# Patient Record
Sex: MALE | Race: WHITE | NOT HISPANIC OR LATINO | Employment: OTHER | URBAN - METROPOLITAN AREA
[De-identification: names, ages, dates, MRNs, and addresses within clinical notes are randomized per-mention and may not be internally consistent; named-entity substitution may affect disease eponyms.]

---

## 2023-11-27 ENCOUNTER — TELEPHONE (OUTPATIENT)
Dept: PULMONOLOGY | Facility: CLINIC | Age: 78
End: 2023-11-27
Payer: MEDICARE

## 2023-11-27 DIAGNOSIS — J44.1 CHRONIC OBSTRUCTIVE PULMONARY DISEASE WITH ACUTE EXACERBATION (MULTI): Primary | ICD-10-CM

## 2023-11-27 RX ORDER — AMOXICILLIN AND CLAVULANATE POTASSIUM 875; 125 MG/1; MG/1
875 TABLET, FILM COATED ORAL 2 TIMES DAILY
Qty: 14 TABLET | Refills: 0 | Status: SHIPPED | OUTPATIENT
Start: 2023-11-27 | End: 2023-12-04

## 2023-11-27 NOTE — TELEPHONE ENCOUNTER
Patient is having COPD flair up due to weather change. He is cough a lot and has lots of phlegm. His phlegm currently is discolored light brownish. Aware you are out today. Can you call in antibiotic to [facility] in [place]?

## 2023-12-04 ENCOUNTER — TELEPHONE (OUTPATIENT)
Dept: PULMONOLOGY | Facility: CLINIC | Age: 78
End: 2023-12-04
Payer: MEDICARE

## 2023-12-04 DIAGNOSIS — J44.1 CHRONIC OBSTRUCTIVE PULMONARY DISEASE WITH ACUTE EXACERBATION (MULTI): Primary | ICD-10-CM

## 2023-12-06 RX ORDER — PREDNISONE 10 MG/1
TABLET ORAL
Qty: 30 TABLET | Refills: 0 | Status: SHIPPED | OUTPATIENT
Start: 2023-12-06 | End: 2024-01-05

## 2023-12-06 RX ORDER — AZITHROMYCIN 250 MG/1
TABLET, FILM COATED ORAL
Qty: 6 TABLET | Refills: 0 | Status: SHIPPED | OUTPATIENT
Start: 2023-12-06 | End: 2023-12-11

## 2023-12-07 NOTE — TELEPHONE ENCOUNTER
Will order a Zpack and prednisone taper. Let him know I ordered PFTS to get before his follow up visit with me in February but wait until he is feeling better and off prednisone for 2 weeks.

## 2023-12-13 ENCOUNTER — APPOINTMENT (OUTPATIENT)
Dept: PULMONOLOGY | Facility: CLINIC | Age: 78
End: 2023-12-13
Payer: MEDICARE

## 2023-12-13 NOTE — PROGRESS NOTES
Patient: Sindhu Salazar    [id number]  : 1945 -- AGE 78 y.o.    Provider: Ami GUZMAN- [facility]     Location [facility]   Service Date:   23       Department of Medicine  Division of Pulmonary, Critical Care, and Sleep Medicine     [facility]  Follow Up Visit Note      HISTORY OF PRESENT ILLNESS     HISTORY OF PRESENT ILLNESS   Sindhu Salazar is a 78 y.o. male who is a former smoker (~75 pack years), who presents to a [facility] for a follow up evaluation for COPD.    DATE OF LAST VISIT: 23    Current History    Since last visit Mr. Salazar had a flare, needed antibiotic and prednisone.  He reports he feels much better and back to baseline.  Still having yellow to light brown mucus, occasional wheezing and having difficulty bringing up phlegm at times.  Has not been using his nebulizer.  Uses Dulera 2 puffs twice a day and rarely using Combivent.  He denies fevers, chills, chest pain and ER visits for breathing issues.  He is up-to-date on vaccines    23: Mr. Salazar was seeing pulmonology at [facility] and is switching his care over to . He is currently using Symbicort 2 puffs BID. He has 3 Dulera inhalers left at home that he is going to switch back to when finished with symbicort. He uses duo nebs twice daily and combivent when he is out, does not use often. Bronchitis 2 months ago requiring medrol dose pack and antibiotic feeling back to baseline. He reports having a cold about a week ago and is feeling much better. He coughs occasionally with yellowish mucus. Occasional wheeze. ETIENNE with stairs. Denies SOB at rest, chest pain and ER visits for breathing issues.      Previous pulmonary history: He was previously told he has COPD. He currently is on no supplemental oxygen. He has never been to pulmonary rehab. Bronchitis 2 months ago requiring medrol dose pack.      Inhalers/nebulized medications: Dulera, combivent  and florian pollard     Hospitalization History: He has not been hospitalized over the last year for breathing related problem.     Sleep history: Denies snoring, apnea, feeling tired during the day or taking naps during the day.        REVIEW OF SYSTEMS     REVIEW OF SYSTEMS  Review of Systems    Constitutional: No fever, no chills, no night sweats.    Eyes: No double vision, no floaters, no dry eyes.   ENT: See HPI.   Neck: No neck stiffness.  Cardiovascular: No sharp chest pain, no heart racing, no leg swelling.  Respiratory: as noted in HPI.   Integumentary: No rashes or sores.  Neurological: No dizziness, no headaches. Sleeping well.  Psychiatric: No mood changes.       ALLERGIES AND MEDICATIONS     ALLERGIES  Not on File    MEDICATIONS  Current Outpatient Medications   Medication Sig Dispense Refill    predniSONE (Deltasone) 10 mg tablet Take 4 tabs (40mg) daily for 3 days, then 3 tabs (30mg) daily for 3 days,  2 tabs (20mg) daily for 3 days,  1 tab (10 mg) daily for 3 days. 30 tablet 0     No current facility-administered medications for this visit.         PAST HISTORY     PAST MEDICAL HISTORY  Prostate Cancer  Basal cell carcinoma of the face  COPD     PAST SURGICAL HISTORY  No past surgical history on file.    IMMUNIZATION HISTORY    There is no immunization history on file for this patient.    SOCIAL HISTORY  Tobacco Smokin -47 y/o - 3 packs - quit in  - ~75 pack years   Illicit drugs: none  Alcohol consumption:  socially  Pets: none    OCCUPATIONAL/ENVIRONMENTAL HISTORY  Occupation: Previously worked as  for [employer]. House he used to live in had asbestos .     FAMILY HISTORY    No have a family history of pulmonary disease.  No have a family history of cancer.      PHYSICAL EXAM     VITAL SIGNS:   Vitals:    23 0808   BP: 125/74   Pulse: 83   Temp: 35.9 °C (96.7 °F)   SpO2: 91%         PREVIOUS WEIGHTS: Body mass index is 24.48 kg/m².    Wt Readings from Last 3 Encounters:  "  05/19/23 67.9 kg (149 lb 12.8 oz)   10/01/21 72.6 kg (160 lb)       Physical Exam    Constitutional: General appearance: Alert and oriented.  No acute distress. Well developed, well nourished.  Head and face: Symmetric  Pulmonary: Chest is normal. No increased work of breathing or signs of respiratory distress. Clear to auscultation bilaterally - no crackles, wheezing, or rhonchi.   Cardiovascular: Heart rate and rhythm normal. Normal S1, S2 - no murmurs, gallops, or pericardial rub.   Abdomen: Soft, non tender, +BS  Extremities: No edema. No clubbing or cyanosis of the fingernails.    Neurologic: Moves all four extremities   MSK: Normal movements of extremities. Gait normal   Psychiatric: Intact judgement and insight.    RESULTS/DATA     Pulmonary Function Test Results     2/2018: FEV1/FVC 0.47 /FEV1 2.06 (74%)/FVC 4.40 (115%)/ TLC  (128%)/ %/ DLCO 51%      Chest Radiograph     No results found for this or any previous visit from the past 2000 days.      Chest CT Scan     No results found for this or any previous visit from the past 365 days.      Echocardiogram     No results found for this or any previous visit from the past 365 days.       Labwork   Complete Blood Count  No results found for: \"WBC\", \"HGB\", \"HCT\", \"MCV\", \"PLT\"    Peripheral Eosinophil Count/Percentage:   No results found for: \"EOSABS\", \"EOSPCT\"    Serum Immunoglobulin E:    No results found for: \"IGE\"       ASSESSMENT/PLAN     Mr. Salazar is a 78 y.o. male, who is a former smoker (~75 pack years), who presents to a [facility] for a follow up evaluation for COPD.    Problem List and Orders  Problem List Items Addressed This Visit    None  Visit Diagnoses       Chronic obstructive pulmonary disease, unspecified COPD type (CMS/HCC)    -  Primary    Relevant Orders    Complete Pulmonary Function Test Pre/Post Bronchodialator (Spirometry Pre/Post/DLCO/Lung Volumes)            Assessment and Plan / " Recommendations:  Problem List Items Addressed This Visit    None     COPD-GOLD2  - will get new PFTs b/f next visit  - Continue Dulera 2 puffs twice a day   - continue Combivent inhaler or ipratropium- albuterol nebulizer every 4-6 hours as needed      - Can try OTC Mucinex BID for mucus relief     Follow up in 6 months (after PFTs) or sooner if needed.    If you have any questions please call the office [phone]    Thank you for visiting the Pulmonary clinic today!   Ami Delacruz CNP  [phone]

## 2023-12-14 ENCOUNTER — OFFICE VISIT (OUTPATIENT)
Dept: PULMONOLOGY | Facility: CLINIC | Age: 78
End: 2023-12-14
Payer: MEDICARE

## 2023-12-14 VITALS
HEART RATE: 83 BPM | BODY MASS INDEX: 24.17 KG/M2 | TEMPERATURE: 96.7 F | DIASTOLIC BLOOD PRESSURE: 74 MMHG | OXYGEN SATURATION: 91 % | WEIGHT: 154 LBS | HEIGHT: 67 IN | SYSTOLIC BLOOD PRESSURE: 125 MMHG

## 2023-12-14 DIAGNOSIS — J44.9 CHRONIC OBSTRUCTIVE PULMONARY DISEASE, UNSPECIFIED COPD TYPE (MULTI): Primary | ICD-10-CM

## 2023-12-14 PROCEDURE — 1126F AMNT PAIN NOTED NONE PRSNT: CPT

## 2023-12-14 PROCEDURE — 1159F MED LIST DOCD IN RCRD: CPT

## 2023-12-14 PROCEDURE — 99214 OFFICE O/P EST MOD 30 MIN: CPT

## 2023-12-14 ASSESSMENT — PATIENT HEALTH QUESTIONNAIRE - PHQ9
SUM OF ALL RESPONSES TO PHQ9 QUESTIONS 1 AND 2: 0
2. FEELING DOWN, DEPRESSED OR HOPELESS: NOT AT ALL
1. LITTLE INTEREST OR PLEASURE IN DOING THINGS: NOT AT ALL

## 2023-12-14 ASSESSMENT — PAIN SCALES - GENERAL: PAINLEVEL: 0-NO PAIN

## 2024-01-02 ENCOUNTER — TELEPHONE (OUTPATIENT)
Dept: PULMONOLOGY | Facility: HOSPITAL | Age: 79
End: 2024-01-02
Payer: MEDICARE

## 2024-01-02 NOTE — TELEPHONE ENCOUNTER
Spoke with pt regarding his trazodone refill request. Pt stated that he has not taken trazodone the last few months and decided to try and get it refilled. It was explained that Ami Delacruz CNP was not the original ordering provider for this medication and he needs to contact his PCP. He stated that he doesn't have a PCP anymore. In the system, María Chapman CNP is listed as pt's PCP. He stated that she retired a couple years ago. He was advised to call that office to see if he can get himself established with a different provider. He stated that he was not going to do that. He verbalized understanding that Ami will not refill his trazodone. He inquired about upcoming appointments. He confirmed his FUV on 6/14/2024 with Ami Delacruz CNP. He was transferred to Allyn Wang to schedule his PFT's. He was also given the phone number to schedule these tests.

## 2024-02-19 ENCOUNTER — TELEPHONE (OUTPATIENT)
Dept: PULMONOLOGY | Facility: CLINIC | Age: 79
End: 2024-02-19
Payer: MEDICARE

## 2024-02-19 DIAGNOSIS — J44.9 CHRONIC OBSTRUCTIVE PULMONARY DISEASE, UNSPECIFIED COPD TYPE (MULTI): Primary | ICD-10-CM

## 2024-02-19 RX ORDER — AZITHROMYCIN 250 MG/1
TABLET, FILM COATED ORAL
Qty: 6 TABLET | Refills: 0 | Status: SHIPPED | OUTPATIENT
Start: 2024-02-19 | End: 2024-02-24

## 2024-02-19 RX ORDER — PREDNISONE 20 MG/1
40 TABLET ORAL DAILY
Qty: 10 TABLET | Refills: 0 | Status: SHIPPED | OUTPATIENT
Start: 2024-02-19 | End: 2024-02-24

## 2024-02-19 NOTE — TELEPHONE ENCOUNTER
Sindhu called in sting that he is coughing, having phlegm. He would would like the antibiotic that you called in for him last time, He uses [facility] in [place]. Please Advise

## 2024-04-12 ENCOUNTER — TELEPHONE (OUTPATIENT)
Dept: PULMONOLOGY | Facility: CLINIC | Age: 79
End: 2024-04-12
Payer: MEDICARE

## 2024-04-12 DIAGNOSIS — J44.1 COPD EXACERBATION (MULTI): Primary | ICD-10-CM

## 2024-04-12 RX ORDER — AMOXICILLIN AND CLAVULANATE POTASSIUM 875; 125 MG/1; MG/1
1 TABLET, FILM COATED ORAL 2 TIMES DAILY
Qty: 20 TABLET | Refills: 0 | Status: SHIPPED | OUTPATIENT
Start: 2024-04-12 | End: 2024-04-22

## 2024-04-12 RX ORDER — PREDNISONE 20 MG/1
40 TABLET ORAL DAILY
Qty: 10 TABLET | Refills: 0 | Status: SHIPPED | OUTPATIENT
Start: 2024-04-12 | End: 2024-04-17

## 2024-04-12 NOTE — TELEPHONE ENCOUNTER
Patient is request refill for prednisone and antibiotic. He states he is currently having a flair up. Cough, phlegm is yellow right now. Can you call in prescription?

## 2024-04-24 ENCOUNTER — TELEPHONE (OUTPATIENT)
Dept: PULMONOLOGY | Facility: CLINIC | Age: 79
End: 2024-04-24
Payer: MEDICARE

## 2024-04-24 NOTE — TELEPHONE ENCOUNTER
Patient called in saying that his bronchial tubes are vibrating. He would like to speak with you about this. Please advise     [phone]

## 2024-05-06 ENCOUNTER — APPOINTMENT (OUTPATIENT)
Dept: RESPIRATORY THERAPY | Facility: HOSPITAL | Age: 79
End: 2024-05-06
Payer: MEDICARE

## 2024-06-06 ENCOUNTER — APPOINTMENT (OUTPATIENT)
Dept: RESPIRATORY THERAPY | Facility: HOSPITAL | Age: 79
End: 2024-06-06
Payer: MEDICARE

## 2024-06-14 ENCOUNTER — APPOINTMENT (OUTPATIENT)
Dept: PULMONOLOGY | Facility: CLINIC | Age: 79
End: 2024-06-14
Payer: MEDICARE

## 2024-06-17 NOTE — PROGRESS NOTES
Patient: Sindhu Salazar    [id number]  : 1945 -- AGE 79 y.o.    Provider: Ami GUZMAN- [facility]     Location [facility]   Service Date:  24       Department of Medicine  Division of Pulmonary, Critical Care, and Sleep Medicine     [facility]  Follow Up Visit Note      HISTORY OF PRESENT ILLNESS     HISTORY OF PRESENT ILLNESS   Sindhu Salazar is a 79 y.o. male with a history of Prostate Cancer, Basal cell carcinoma of the face, and COPD, who is a former smoker (~75 pack years), who presents to a [facility] for a follow up evaluation for COPD.    DATE OF LAST VISIT: 23    Current History    Since last visit he reports using Dulera 2 puffs twice daily.  Rarely uses Combivent.  Uses Mucinex as needed states it was helping but he is afraid to overdo it.  Uses nebulizer once a day.  States he is coughing up yellow phlegm, wheezing and having dyspnea on exertion.  He feels his symptoms have worsened since December.  He has PFTs scheduled for 2024 he denies shortness of breath at rest, chest tightness, GERD, nighttime symptoms and ER visits for breathing issues.    23: Since last visit Mr. Salazar had a flare, needed antibiotic and prednisone.  He reports he feels much better and back to baseline.  Still having yellow to light brown mucus, occasional wheezing and having difficulty bringing up phlegm at times.  Has not been using his nebulizer.  Uses Dulera 2 puffs twice a day and rarely using Combivent.  He denies fevers, chills, chest pain and ER visits for breathing issues.  He is up-to-date on vaccines    23: Mr. Salazar was seeing pulmonology at [facility] and is switching his care over to . He is currently using Symbicort 2 puffs BID. He has 3 Dulera inhalers left at home that he is going to switch back to when finished with symbicort. He uses duo nebs twice daily and combivent when he is out, does not use  often. Bronchitis 2 months ago requiring medrol dose pack and antibiotic feeling back to baseline. He reports having a cold about a week ago and is feeling much better. He coughs occasionally with yellowish mucus. Occasional wheeze. ETIENNE with stairs. Denies SOB at rest, chest pain and ER visits for breathing issues.      Previous pulmonary history: He was previously told he has COPD. He currently is on no supplemental oxygen. He has never been to pulmonary rehab. Bronchitis 2 months ago requiring medrol dose pack.      Inhalers/nebulized medications: Dulera, combivent and duo nebs     Hospitalization History: He has not been hospitalized over the last year for breathing related problem.     Sleep history: Denies snoring, apnea, feeling tired during the day or taking naps during the day.        REVIEW OF SYSTEMS     REVIEW OF SYSTEMS  Review of Systems    Constitutional: No fever, no chills, no night sweats.    Eyes: No double vision, no floaters, no dry eyes.   ENT: See HPI.   Neck: No neck stiffness.  Cardiovascular: No sharp chest pain, no heart racing, no leg swelling.  Respiratory: as noted in HPI.   Integumentary: No rashes or sores.  Neurological: No dizziness, no headaches. Sleeping well.  Psychiatric: No mood changes.       ALLERGIES AND MEDICATIONS     ALLERGIES  Allergies   Allergen Reactions    Iodine Hives     IVP DYE    Sulfa (Sulfonamide Antibiotics) Rash     Remote       MEDICATIONS  Current Outpatient Medications   Medication Sig Dispense Refill    ipratropium-albuteroL (Combivent Respimat)  mcg/actuation inhaler Inhale 1 puff 4 times a day.      mometasone-formoterol (Dulera 200) 200-5 mcg/actuation inhaler Inhale 400 mcg twice a day.       No current facility-administered medications for this visit.         PAST HISTORY     PAST MEDICAL HISTORY  Prostate Cancer  Basal cell carcinoma of the face  COPD     PAST SURGICAL HISTORY  No past surgical history on file.    IMMUNIZATION  HISTORY  Immunization History   Administered Date(s) Administered    Pfizer Gray Cap SARS-CoV-2 2022    Pfizer Purple Cap SARS-CoV-2 2021, 2021, 10/15/2021       SOCIAL HISTORY  Tobacco Smokin -45 y/o - 3 packs - quit in  - ~75 pack years   Illicit drugs: none  Alcohol consumption:  socially  Pets: none    OCCUPATIONAL/ENVIRONMENTAL HISTORY  Occupation: Previously worked as  for [employer]. House he used to live in had asbestos .     FAMILY HISTORY    No have a family history of pulmonary disease.  No have a family history of cancer.      PHYSICAL EXAM     VITAL SIGNS:   There were no vitals filed for this visit.        PREVIOUS WEIGHTS: There is no height or weight on file to calculate BMI.    Wt Readings from Last 3 Encounters:   23 69.9 kg (154 lb)   23 67.9 kg (149 lb 12.8 oz)   10/01/21 72.6 kg (160 lb)       Physical Exam    Constitutional: General appearance: Alert and oriented.  No acute distress. Well developed, well nourished.  Head and face: Symmetric  Pulmonary: Chest is normal. No increased work of breathing or signs of respiratory distress. Clear to auscultation bilaterally - no crackles, wheezing, or rhonchi.   Cardiovascular: Heart rate and rhythm normal. Normal S1, S2 - no murmurs, gallops, or pericardial rub.   Abdomen: Soft, non tender, +BS  Extremities: No edema. No clubbing or cyanosis of the fingernails.    Neurologic: Moves all four extremities   MSK: Normal movements of extremities. Gait normal   Psychiatric: Intact judgement and insight.    RESULTS/DATA     Pulmonary Function Test Results     2018: FEV1/FVC 0.47 /FEV1 2.06 (74%)/FVC 4.40 (115%)/ TLC  (128%)/ %/ DLCO 51%      Chest Radiograph     No results found for this or any previous visit from the past 2000 days.      Chest CT Scan     No results found for this or any previous visit from the past 365 days.      Echocardiogram     No results found for this or any previous visit  "from the past 365 days.       Labwork   Complete Blood Count  No results found for: \"WBC\", \"HGB\", \"HCT\", \"MCV\", \"PLT\"    Peripheral Eosinophil Count/Percentage:   No results found for: \"EOSABS\", \"EOSPCT\"    Serum Immunoglobulin E:    No results found for: \"IGE\"       ASSESSMENT/PLAN     Mr. Salazar is a 79 y.o. male, with a history of Prostate Cancer, Basal cell carcinoma of the face, and COPD, who is a former smoker (~75 pack years), who presents to a [facility] for a follow up evaluation for COPD.        Problem List and Orders  Problem List Items Addressed This Visit    None        Assessment and Plan / Recommendations:  Problem List Items Addressed This Visit    None     COPD-GOLD2 (PFTs from 2018)  - will get new PFTs b/f next visit- scheduled for 7/29/24  - Continue Dulera 2 puffs twice a day   -Start Spiriva 1 capsule daily  - continue Combivent inhaler or ipratropium- albuterol nebulizer every 4-6 hours as needed  - Can try OTC Mucinex BID for mucus relief   - Start Augmentin for 10 days and prednisone burst for COPD flare up    Follow up in 2 months (after PFTs) or sooner if needed.  Has an appointment with Dr. Delgado in August that he would like to keep    If you have any questions please call the office [phone]    Thank you for visiting the Pulmonary clinic today!   Ami Delacruz CNP  [phone]      "

## 2024-06-19 ENCOUNTER — APPOINTMENT (OUTPATIENT)
Dept: PULMONOLOGY | Facility: CLINIC | Age: 79
End: 2024-06-19
Payer: MEDICARE

## 2024-06-19 VITALS
HEART RATE: 81 BPM | BODY MASS INDEX: 22.44 KG/M2 | OXYGEN SATURATION: 90 % | DIASTOLIC BLOOD PRESSURE: 78 MMHG | SYSTOLIC BLOOD PRESSURE: 116 MMHG | HEIGHT: 67 IN | TEMPERATURE: 98.4 F | WEIGHT: 143 LBS

## 2024-06-19 DIAGNOSIS — J44.1 COPD EXACERBATION (MULTI): ICD-10-CM

## 2024-06-19 DIAGNOSIS — J44.9 CHRONIC OBSTRUCTIVE PULMONARY DISEASE, UNSPECIFIED COPD TYPE (MULTI): Primary | ICD-10-CM

## 2024-06-19 PROCEDURE — 99214 OFFICE O/P EST MOD 30 MIN: CPT

## 2024-06-19 PROCEDURE — 1036F TOBACCO NON-USER: CPT

## 2024-06-19 PROCEDURE — 1159F MED LIST DOCD IN RCRD: CPT

## 2024-06-19 PROCEDURE — 1126F AMNT PAIN NOTED NONE PRSNT: CPT

## 2024-06-19 RX ORDER — TIOTROPIUM BROMIDE 18 UG/1
1 CAPSULE ORAL; RESPIRATORY (INHALATION)
Qty: 30 CAPSULE | Refills: 11 | Status: SHIPPED | OUTPATIENT
Start: 2024-06-19 | End: 2025-06-19

## 2024-06-19 RX ORDER — PREDNISONE 20 MG/1
40 TABLET ORAL DAILY
Qty: 10 TABLET | Refills: 0 | Status: SHIPPED | OUTPATIENT
Start: 2024-06-19 | End: 2024-06-24

## 2024-06-19 RX ORDER — AMOXICILLIN AND CLAVULANATE POTASSIUM 875; 125 MG/1; MG/1
1 TABLET, FILM COATED ORAL 2 TIMES DAILY
Qty: 20 TABLET | Refills: 0 | Status: SHIPPED | OUTPATIENT
Start: 2024-06-19 | End: 2024-06-29

## 2024-06-19 ASSESSMENT — ENCOUNTER SYMPTOMS
DEPRESSION: 0
OCCASIONAL FEELINGS OF UNSTEADINESS: 0
LOSS OF SENSATION IN FEET: 0

## 2024-06-19 ASSESSMENT — PATIENT HEALTH QUESTIONNAIRE - PHQ9
2. FEELING DOWN, DEPRESSED OR HOPELESS: NOT AT ALL
1. LITTLE INTEREST OR PLEASURE IN DOING THINGS: NOT AT ALL
SUM OF ALL RESPONSES TO PHQ9 QUESTIONS 1 AND 2: 0

## 2024-06-19 ASSESSMENT — PAIN SCALES - GENERAL: PAINLEVEL: 0-NO PAIN

## 2024-06-19 ASSESSMENT — COPD QUESTIONNAIRES: CAT_TOTALSCORE: 18

## 2024-07-29 ENCOUNTER — HOSPITAL ENCOUNTER (OUTPATIENT)
Dept: RESPIRATORY THERAPY | Facility: HOSPITAL | Age: 79
Discharge: HOME | End: 2024-07-29
Payer: MEDICARE

## 2024-07-29 DIAGNOSIS — J44.9 CHRONIC OBSTRUCTIVE PULMONARY DISEASE, UNSPECIFIED COPD TYPE (MULTI): ICD-10-CM

## 2024-07-29 LAB
MGC ASCENT PFT - FEV1 - POST: 1.47
MGC ASCENT PFT - FEV1 - PRE: 1.5
MGC ASCENT PFT - FEV1 - PREDICTED: 2.43
MGC ASCENT PFT - FVC - POST: 3.81
MGC ASCENT PFT - FVC - PRE: 3.81
MGC ASCENT PFT - FVC - PREDICTED: 3.25

## 2024-07-29 PROCEDURE — 94726 PLETHYSMOGRAPHY LUNG VOLUMES: CPT

## 2024-08-07 ENCOUNTER — APPOINTMENT (OUTPATIENT)
Dept: PULMONOLOGY | Facility: CLINIC | Age: 79
End: 2024-08-07
Payer: MEDICARE

## 2024-08-19 DIAGNOSIS — J44.9 CHRONIC OBSTRUCTIVE PULMONARY DISEASE, UNSPECIFIED COPD TYPE (MULTI): ICD-10-CM

## 2024-08-20 NOTE — TELEPHONE ENCOUNTER
Patient was suppose to be seen during the power outage and was told that we would be contacting him to reschedule. Can you please advise when and where you would like to add him on your schedule. Thanks

## 2024-09-30 ENCOUNTER — TELEPHONE (OUTPATIENT)
Dept: PULMONOLOGY | Facility: CLINIC | Age: 79
End: 2024-09-30
Payer: MEDICARE

## 2024-09-30 NOTE — TELEPHONE ENCOUNTER
Patient called in saying that he is having a flare up and would like the doxycyline & prednisone sent to [facility] in [place]. Thanks

## 2024-10-01 DIAGNOSIS — J44.1 COPD EXACERBATION (MULTI): Primary | ICD-10-CM

## 2024-10-01 RX ORDER — AMOXICILLIN AND CLAVULANATE POTASSIUM 875; 125 MG/1; MG/1
1 TABLET, FILM COATED ORAL 2 TIMES DAILY
Qty: 20 TABLET | Refills: 0 | Status: SHIPPED | OUTPATIENT
Start: 2024-10-01 | End: 2024-10-11

## 2024-10-01 RX ORDER — PREDNISONE 10 MG/1
TABLET ORAL
Qty: 30 TABLET | Refills: 0 | Status: SHIPPED | OUTPATIENT
Start: 2024-10-01 | End: 2024-10-31

## 2024-11-01 NOTE — PROGRESS NOTES
Patient: Sindhu Salazar    [id number]  : 1945 -- AGE 79 y.o.    Provider: Ami ALFRED [facility]   Service Date: 24       Department of Medicine  Division of Pulmonary, Critical Care, and Sleep Medicine     [facility]  Follow Up Visit Note      HISTORY OF PRESENT ILLNESS     HISTORY OF PRESENT ILLNESS   Sindhu Salazar is a 79 y.o. male with a history of Prostate Cancer, Basal cell carcinoma of the face, and COPD, who is a former smoker (~75 pack years), who presents to a [facility] for a follow up evaluation for COPD.    DATE OF LAST VISIT: 23    Current History    Since last visit he reports productive cough on/off, having days with increased mucus and some days not as much.  Has been using Mucinex as needed.  He was prescribed NAC a while back that he was not interested in taking after seeing side effects.  Continues Dulera 2 puffs twice daily.  Did not start Spiriva after last visit.  Using nebulizer once daily.  Last visit he complained of feeling his symptoms of shortness of breath were worsening.  Did get new PFTs that revealed moderate obstruction and reduced DLCO with air trapping, as well as glottic closure.    6MWT he started at 95% on room air.  He desaturated after 3 minutes and 30 seconds to 86% on room air.  After 2 L of supplemental oxygen he incremented to 95% on 2 L of oxygen.  CAT 19    24: Since last visit he reports using Dulera 2 puffs twice daily.  Rarely uses Combivent.  Uses Mucinex as needed states it was helping but he is afraid to overdo it.  Uses nebulizer once a day.  States he is coughing up yellow phlegm, wheezing and having dyspnea on exertion.  He feels his symptoms have worsened since December.  He has PFTs scheduled for 2024 he denies shortness of breath at rest, chest tightness, GERD, nighttime symptoms and ER visits for breathing  issues.    12/14/23: Since last visit Mr. Salazar had a flare, needed antibiotic and prednisone.  He reports he feels much better and back to baseline.  Still having yellow to light brown mucus, occasional wheezing and having difficulty bringing up phlegm at times.  Has not been using his nebulizer.  Uses Dulera 2 puffs twice a day and rarely using Combivent.  He denies fevers, chills, chest pain and ER visits for breathing issues.  He is up-to-date on vaccines    5/19/23: Mr. Salazar was seeing pulmonology at [facility] and is switching his care over to . He is currently using Symbicort 2 puffs BID. He has 3 Dulera inhalers left at home that he is going to switch back to when finished with symbicort. He uses duo nebs twice daily and combivent when he is out, does not use often. Bronchitis 2 months ago requiring medrol dose pack and antibiotic feeling back to baseline. He reports having a cold about a week ago and is feeling much better. He coughs occasionally with yellowish mucus. Occasional wheeze. ETIENNE with stairs. Denies SOB at rest, chest pain and ER visits for breathing issues.      Previous pulmonary history: COPD.     Inhalers/nebulized medications: Dulera, combivent and duo nebs     Hospitalization History: He has not been hospitalized over the last year for breathing related problem.     Sleep history: Denies snoring, apnea, feeling tired during the day or taking naps during the day.        REVIEW OF SYSTEMS     REVIEW OF SYSTEMS  Review of Systems    Constitutional: No fever, no chills, no night sweats.    Eyes: No double vision, no floaters, no dry eyes.   ENT: See HPI.   Neck: No neck stiffness.  Cardiovascular: No sharp chest pain, no heart racing, no leg swelling.  Respiratory: as noted in HPI.   Integumentary: No rashes or sores.  Neurological: No dizziness, no headaches. Sleeping well.  Psychiatric: No mood changes.       ALLERGIES AND MEDICATIONS     ALLERGIES  Allergies   Allergen Reactions    Iodine  Hives     IVP DYE    Sulfa (Sulfonamide Antibiotics) Rash     Remote       MEDICATIONS  Current Outpatient Medications   Medication Sig Dispense Refill    ipratropium-albuteroL (Combivent Respimat)  mcg/actuation inhaler Inhale 1 puff 4 times a day.      mometasone-formoterol (Dulera 200) 200-5 mcg/actuation inhaler Inhale 2 puffs 2 times a day. 39 g 1    tiotropium (Spiriva) 18 mcg inhalation capsule Place 1 capsule (18 mcg) into inhaler and inhale once daily. 30 capsule 11     No current facility-administered medications for this visit.         PAST HISTORY     PAST MEDICAL HISTORY  Prostate Cancer  Basal cell carcinoma of the face  COPD     PAST SURGICAL HISTORY  No past surgical history on file.    IMMUNIZATION HISTORY  Immunization History   Administered Date(s) Administered    Pfizer Gray Cap SARS-CoV-2 2022    Pfizer Purple Cap SARS-CoV-2 2021, 2021, 10/15/2021       SOCIAL HISTORY  Tobacco Smokin -47 y/o - 3 packs - quit in  - ~75 pack years   Illicit drugs: none  Alcohol consumption:  socially  Pets: none    OCCUPATIONAL/ENVIRONMENTAL HISTORY  Occupation: Previously worked as  for [employer]. House he used to live in had asbestos .     FAMILY HISTORY    No have a family history of pulmonary disease.  No have a family history of cancer.      PHYSICAL EXAM     VITAL SIGNS:   Vitals:    24 0843   BP: 126/80   Pulse: 78   Resp: 18   Temp: 36.6 °C (97.9 °F)   SpO2: 92%             PREVIOUS WEIGHTS: Body mass index is 24.1 kg/m².      Wt Readings from Last 3 Encounters:   24 64.9 kg (143 lb)   23 69.9 kg (154 lb)   23 65.8 kg (145 lb 1 oz)       Physical Exam    Constitutional: General appearance: Alert and oriented.  No acute distress. Well developed, well nourished.  Head and face: Symmetric  Pulmonary: Chest is normal. No increased work of breathing or signs of respiratory distress. Clear to auscultation bilaterally - no crackles, wheezing,  "or rhonchi.   Cardiovascular: Heart rate and rhythm normal. Normal S1, S2 - no murmurs, gallops, or pericardial rub.   Abdomen: Soft, non tender, +BS  Extremities: No edema. No clubbing or cyanosis of the fingernails.    Neurologic: Moves all four extremities   MSK: Normal movements of extremities. Gait normal   Psychiatric: Intact judgement and insight.    RESULTS/DATA     Pulmonary Function Test Results                   Chest Radiograph     No results found for this or any previous visit from the past 2000 days.      Chest CT Scan     No results found for this or any previous visit from the past 365 days.      Echocardiogram     No results found for this or any previous visit from the past 365 days.       Labwork   Complete Blood Count  No results found for: \"WBC\", \"HGB\", \"HCT\", \"MCV\", \"PLT\"    Peripheral Eosinophil Count/Percentage:   No results found for: \"EOSABS\", \"EOSPCT\"    Serum Immunoglobulin E:    No results found for: \"IGE\"       ASSESSMENT/PLAN     Mr. Salazar is a 79 y.o. male, with a history of Prostate Cancer, Basal cell carcinoma of the face, and COPD, who is a former smoker (~75 pack years), who presents to a [facility] for a follow up evaluation for COPD.    6MWT 11/6/24: Patient started at 95% on room air.  He desaturated after 3 minutes and 30 seconds to 86% on room air.  After 2 L of supplemental oxygen he incremented to 95% on 2 L of oxygen.    Problem List and Orders  Problem List Items Addressed This Visit    None        Assessment and Plan / Recommendations:  Problem List Items Addressed This Visit    None     COPD-GOLD2 (PFTs from 2018)  - Continue Dulera 2 puffs twice a day - rinse mouth after each use  - Patient states he did not want to start Spiriva inhaler  - continue Combivent inhaler or ipratropium- albuterol nebulizer every 4-6 hours as needed  -Continue OTC Mucinex BID for mucus relief     Hypoxia  - Will order supplemental oxygen  - Wear 2 L of " oxygen to maintain an SpO2 of 90% or greater with activity and sleep  - will get CT chest    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 Delgado 12/12/2024 that he would like to keep, would like to meet him) or sooner if needed.     If you have any questions please call the office [phone]    Thank you for visiting the Pulmonary clinic today!   Ami Delacruz CNP  [phone]

## 2024-11-06 ENCOUNTER — APPOINTMENT (OUTPATIENT)
Dept: PULMONOLOGY | Facility: CLINIC | Age: 79
End: 2024-11-06
Payer: MEDICARE

## 2024-11-06 VITALS
DIASTOLIC BLOOD PRESSURE: 80 MMHG | BODY MASS INDEX: 23.79 KG/M2 | WEIGHT: 151.6 LBS | HEIGHT: 67 IN | SYSTOLIC BLOOD PRESSURE: 126 MMHG | OXYGEN SATURATION: 92 % | HEART RATE: 78 BPM | TEMPERATURE: 97.9 F | RESPIRATION RATE: 18 BRPM

## 2024-11-06 DIAGNOSIS — J38.3 GLOTTIC INSUFFICIENCY: ICD-10-CM

## 2024-11-06 DIAGNOSIS — J44.9 CHRONIC OBSTRUCTIVE PULMONARY DISEASE, UNSPECIFIED COPD TYPE (MULTI): Primary | ICD-10-CM

## 2024-11-06 DIAGNOSIS — R09.02 HYPOXIA: ICD-10-CM

## 2024-11-06 PROCEDURE — 1159F MED LIST DOCD IN RCRD: CPT

## 2024-11-06 PROCEDURE — 99214 OFFICE O/P EST MOD 30 MIN: CPT

## 2024-11-06 PROCEDURE — 1036F TOBACCO NON-USER: CPT

## 2024-11-06 ASSESSMENT — ENCOUNTER SYMPTOMS
LOSS OF SENSATION IN FEET: 0
DEPRESSION: 0
OCCASIONAL FEELINGS OF UNSTEADINESS: 0

## 2024-11-06 ASSESSMENT — COLUMBIA-SUICIDE SEVERITY RATING SCALE - C-SSRS
2. HAVE YOU ACTUALLY HAD ANY THOUGHTS OF KILLING YOURSELF?: NO
6. HAVE YOU EVER DONE ANYTHING, STARTED TO DO ANYTHING, OR PREPARED TO DO ANYTHING TO END YOUR LIFE?: NO
1. IN THE PAST MONTH, HAVE YOU WISHED YOU WERE DEAD OR WISHED YOU COULD GO TO SLEEP AND NOT WAKE UP?: NO

## 2024-11-06 ASSESSMENT — PATIENT HEALTH QUESTIONNAIRE - PHQ9
1. LITTLE INTEREST OR PLEASURE IN DOING THINGS: NOT AT ALL
SUM OF ALL RESPONSES TO PHQ9 QUESTIONS 1 AND 2: 0
2. FEELING DOWN, DEPRESSED OR HOPELESS: NOT AT ALL

## 2024-11-06 ASSESSMENT — COPD QUESTIONNAIRES
QUESTION2_CHESTPHLEGM: 4
QUESTION5_HOMEACTIVITIES: 2
QUESTION1_COUGHFREQUENCY: 4
QUESTION4_WALKINCLINE: 3
QUESTION8_ENERGYLEVEL: 1
CAT_TOTALSCORE: 19
QUESTION7_SLEEPQUALITY: 1
QUESTION6_LEAVINGHOUSE: 1
QUESTION3_CHESTTIGHTNESS: 3

## 2024-11-07 ENCOUNTER — TELEPHONE (OUTPATIENT)
Dept: PULMONOLOGY | Facility: CLINIC | Age: 79
End: 2024-11-07
Payer: MEDICARE

## 2024-11-07 DIAGNOSIS — J44.1 CHRONIC OBSTRUCTIVE PULMONARY DISEASE WITH ACUTE EXACERBATION (MULTI): ICD-10-CM

## 2024-11-07 DIAGNOSIS — R09.02 HYPOXIA: Primary | ICD-10-CM

## 2024-11-07 DIAGNOSIS — J44.1 COPD EXACERBATION (MULTI): ICD-10-CM

## 2024-11-07 NOTE — TELEPHONE ENCOUNTER
Pt said oxygen delivery was for a large  24hr use oxygen supply but he was under the impression that he was getting a night time use only O2 machine. He is not sure what to do.     Please Advise.         9:36 am  Called pt to give clarity about the order and let him know that the order was corrected and resent to [facility]. Left a Voice Mail Msg.

## 2024-11-08 NOTE — TELEPHONE ENCOUNTER
Sindhu called to ask why he needed a portable  and a stationary O2 Machine if he did not need 24hr O2. I explained that the goal was to have PRN daytime usage when active as well as a stationary supply for night time usage. He still felt it was too much and then expressed worry over the cost of both options.   He then demanded the number to [facility] and then called back several minutes later to request ([address]) to speak to only Ami about the issue. We were not able to sufficiently help him.

## 2024-12-12 ENCOUNTER — APPOINTMENT (OUTPATIENT)
Dept: PULMONOLOGY | Facility: CLINIC | Age: 79
End: 2024-12-12
Payer: MEDICARE

## 2024-12-12 VITALS
TEMPERATURE: 97.7 F | HEIGHT: 66 IN | SYSTOLIC BLOOD PRESSURE: 112 MMHG | OXYGEN SATURATION: 93 % | HEART RATE: 77 BPM | WEIGHT: 155 LBS | DIASTOLIC BLOOD PRESSURE: 71 MMHG | BODY MASS INDEX: 24.91 KG/M2 | RESPIRATION RATE: 16 BRPM

## 2024-12-12 DIAGNOSIS — J96.11 CHRONIC RESPIRATORY FAILURE WITH HYPOXIA (MULTI): ICD-10-CM

## 2024-12-12 DIAGNOSIS — J41.8 MIXED SIMPLE AND MUCOPURULENT CHRONIC BRONCHITIS (MULTI): Primary | ICD-10-CM

## 2024-12-12 PROCEDURE — 1159F MED LIST DOCD IN RCRD: CPT | Performed by: INTERNAL MEDICINE

## 2024-12-12 PROCEDURE — 99214 OFFICE O/P EST MOD 30 MIN: CPT | Performed by: INTERNAL MEDICINE

## 2024-12-12 RX ORDER — ACETYLCYSTEINE 200 MG/ML
4 SOLUTION ORAL; RESPIRATORY (INHALATION)
Qty: 240 ML | Refills: 6 | Status: SHIPPED | OUTPATIENT
Start: 2024-12-12 | End: 2025-12-12

## 2024-12-12 ASSESSMENT — COPD QUESTIONNAIRES
QUESTION7_SLEEPQUALITY: 3
QUESTION8_ENERGYLEVEL: 4
QUESTION3_CHESTTIGHTNESS: 2
CAT_TOTALSCORE: 24
QUESTION6_LEAVINGHOUSE: 2
QUESTION4_WALKINCLINE: 4
QUESTION2_CHESTPHLEGM: 3
QUESTION5_HOMEACTIVITIES: 3
QUESTION1_COUGHFREQUENCY: 3

## 2024-12-23 ENCOUNTER — HOSPITAL ENCOUNTER (OUTPATIENT)
Dept: RADIOLOGY | Facility: HOSPITAL | Age: 79
Discharge: HOME | End: 2024-12-23
Payer: MEDICARE

## 2024-12-23 DIAGNOSIS — R09.02 HYPOXIA: ICD-10-CM

## 2024-12-23 PROCEDURE — 71250 CT THORAX DX C-: CPT

## 2024-12-24 NOTE — PROGRESS NOTES
[facility]  Follow Up Visit Note      HISTORY OF PRESENT ILLNESS     HISTORY OF PRESENT ILLNESS   Sindhu Salazar is a 79 y.o. male with a history of Prostate Cancer, Basal cell carcinoma of the face, and COPD, who is a former smoker (~75 pack years), who presents to a [facility] for a follow up evaluation for COPD.      Current History  Mr. Salazar presented to the office today for follow-up.  No acute respiratory symptoms reported today.  We reviewed most recent pulmonary function test which revealed mild airflow obstruction with no significant change in spirometry post bronchodilators.  Diffusion capacity is moderately declined likely due to emphysema.  He is scheduled for a CT scan of chest later this month.  No interval history of acute COPD exacerbation.    Previous notes by Ami Delacruz CNP  11/6/2024: Since last visit he reports productive cough on/off, having days with increased mucus and some days not as much.  Has been using Mucinex as needed.  He was prescribed NAC a while back that he was not interested in taking after seeing side effects.  Continues Dulera 2 puffs twice daily.  Did not start Spiriva after last visit.  Using nebulizer once daily.  Last visit he complained of feeling his symptoms of shortness of breath were worsening.  Did get new PFTs that revealed moderate obstruction and reduced DLCO with air trapping, as well as glottic closure.    6MWT he started at 95% on room air.  He desaturated after 3 minutes and 30 seconds to 86% on room air.  After 2 L of supplemental oxygen he incremented to 95% on 2 L of oxygen.  CAT 19    6/19/24: Since last visit he reports using Dulera 2 puffs twice daily.  Rarely uses Combivent.  Uses Mucinex as needed states it was helping but he is afraid to overdo it.  Uses nebulizer once a day.  States he is coughing up yellow phlegm, wheezing and having dyspnea on exertion.  He feels his  symptoms have worsened since December.  He has PFTs scheduled for 7/29/2024 he denies shortness of breath at rest, chest tightness, GERD, nighttime symptoms and ER visits for breathing issues.    12/14/23: Since last visit Mr. Salazar had a flare, needed antibiotic and prednisone.  He reports he feels much better and back to baseline.  Still having yellow to light brown mucus, occasional wheezing and having difficulty bringing up phlegm at times.  Has not been using his nebulizer.  Uses Dulera 2 puffs twice a day and rarely using Combivent.  He denies fevers, chills, chest pain and ER visits for breathing issues.  He is up-to-date on vaccines    5/19/23: Mr. Salazar was seeing pulmonology at [facility] and is switching his care over to . He is currently using Symbicort 2 puffs BID. He has 3 Dulera inhalers left at home that he is going to switch back to when finished with symbicort. He uses duo nebs twice daily and combivent when he is out, does not use often. Bronchitis 2 months ago requiring medrol dose pack and antibiotic feeling back to baseline. He reports having a cold about a week ago and is feeling much better. He coughs occasionally with yellowish mucus. Occasional wheeze. ETIENNE with stairs. Denies SOB at rest, chest pain and ER visits for breathing issues.      Previous pulmonary history: COPD.     Inhalers/nebulized medications: Dulera, combivent and duo nebs     Hospitalization History: He has not been hospitalized over the last year for breathing related problem.     Sleep history: Denies snoring, apnea, feeling tired during the day or taking naps during the day.        REVIEW OF SYSTEMS     REVIEW OF SYSTEMS  Review of Systems    Constitutional: No fever, no chills, no night sweats.    Eyes: No double vision, no floaters, no dry eyes.   ENT: See HPI.   Neck: No neck stiffness.  Cardiovascular: No sharp chest pain, no heart racing, no leg swelling.  Respiratory: as noted in HPI.   Integumentary: No rashes or  sores.  Neurological: No dizziness, no headaches. Sleeping well.  Psychiatric: No mood changes.       ALLERGIES AND MEDICATIONS     ALLERGIES  Allergies   Allergen Reactions    Iodine Hives     IVP DYE    Sulfa (Sulfonamide Antibiotics) Rash     Remote       MEDICATIONS  Current Outpatient Medications   Medication Sig Dispense Refill    ipratropium-albuteroL (Combivent Respimat)  mcg/actuation inhaler Inhale 1 puff 4 times a day.      mometasone-formoterol (Dulera 200) 200-5 mcg/actuation inhaler Inhale 2 puffs 2 times a day. 39 g 1    acetylcysteine (Mucomyst) 200 mg/mL (20 %) nebulizer solution Take 4 mL (800 mg) by nebulization 2 times a day. 240 mL 6    tiotropium (Spiriva) 18 mcg inhalation capsule Place 1 capsule (18 mcg) into inhaler and inhale once daily. (Patient not taking: Reported on 2024) 30 capsule 11     No current facility-administered medications for this visit.         PAST HISTORY     PAST MEDICAL HISTORY  Prostate Cancer  Basal cell carcinoma of the face  COPD     PAST SURGICAL HISTORY  No past surgical history on file.    IMMUNIZATION HISTORY  Immunization History   Administered Date(s) Administered    Pfizer Gray Cap SARS-CoV-2 2022       SOCIAL HISTORY  Tobacco Smokin -47 y/o - 3 packs - quit in  - ~75 pack years   Illicit drugs: none  Alcohol consumption:  socially  Pets: none    OCCUPATIONAL/ENVIRONMENTAL HISTORY  Occupation: Previously worked as  for [employer]. House he used to live in had asbestos .     FAMILY HISTORY    No have a family history of pulmonary disease.  No have a family history of cancer.      PHYSICAL EXAM     VITAL SIGNS:   Vitals:    24 0836   BP: 112/71   Pulse: 77   Resp: 16   Temp: 36.5 °C (97.7 °F)   SpO2: 93%             PREVIOUS WEIGHTS: Body mass index is 25.02 kg/m².      Wt Readings from Last 3 Encounters:   24 70.3 kg (155 lb)   24 68.8 kg (151 lb 9.6 oz)   24 64.9 kg (143 lb)       Physical  "Exam    Constitutional: General appearance: Alert and oriented.  No acute distress. Well developed, well nourished.  Head and face: Symmetric  Pulmonary: Chest is normal. No increased work of breathing or signs of respiratory distress.  Prolonged exhalation, Clear to auscultation bilaterally - no crackles, wheezing, or rhonchi.   Cardiovascular: Heart rate and rhythm normal. Normal S1, S2   Extremities: No edema. No clubbing or cyanosis of the fingernails.    Neurologic: Moves all four extremities  Psychiatric: Intact judgement and insight.    RESULTS/DATA     Pulmonary Function Test Results                   Chest Radiograph     No results found for this or any previous visit from the past 2000 days.      Chest CT Scan     No results found for this or any previous visit from the past 365 days.      Echocardiogram     No results found for this or any previous visit from the past 365 days.       Labwork   Complete Blood Count  No results found for: \"WBC\", \"HGB\", \"HCT\", \"MCV\", \"PLT\"    Peripheral Eosinophil Count/Percentage:   No results found for: \"EOSABS\", \"EOSPCT\"    Serum Immunoglobulin E:    No results found for: \"IGE\"       ASSESSMENT/PLAN     Mr. Salazar is a 79 y.o. male, with a history of Prostate Cancer, Basal cell carcinoma of the face, and COPD, who is a former smoker (~75 pack years), who presents to a [facility] for a follow up evaluation for COPD.    6MWT 11/6/24: Patient started at 95% on room air.  He desaturated after 3 minutes and 30 seconds to 86% on room air.  After 2 L of supplemental oxygen he incremented to 95% on 2 L of oxygen.    Problem List and Orders  Problem List Items Addressed This Visit    None  Visit Diagnoses       Mixed simple and mucopurulent chronic bronchitis (Multi)    -  Primary    Relevant Medications    acetylcysteine (Mucomyst) 200 mg/mL (20 %) nebulizer solution              Assessment and Plan / Recommendations:  Problem List Items " Addressed This Visit    None  Visit Diagnoses       Mixed simple and mucopurulent chronic bronchitis (Multi)    -  Primary    Relevant Medications    acetylcysteine (Mucomyst) 200 mg/mL (20 %) nebulizer solution           COPD (mild airflow obstruction) with moderate decline in DLCO  - Continue on current inhaler regimen  - Continue Combivent inhaler or ipratropium- albuterol nebulizer every 4-6 hours as needed  - Added nebulized mucomyst to help with expectoration.    Chronic hypoxic respiratory failure  - Wear 2 L of oxygen to maintain an SpO2 of 90% or greater with activity and sleep  - Follow up CT scan of chest results.    Follow up in 6 months , sooner if necessary based on CT scan results.    If you have any questions please call the office [phone]

## 2025-01-13 ENCOUNTER — TELEPHONE (OUTPATIENT)
Facility: CLINIC | Age: 80
End: 2025-01-13

## 2025-01-13 NOTE — TELEPHONE ENCOUNTER
Patient called.    Would like you to give him a call to review CT scan.    Please review.  Thanks

## 2025-01-17 ENCOUNTER — TELEPHONE (OUTPATIENT)
Facility: CLINIC | Age: 80
End: 2025-01-17
Payer: MEDICARE

## 2025-01-17 DIAGNOSIS — J41.8 MIXED SIMPLE AND MUCOPURULENT CHRONIC BRONCHITIS (MULTI): Primary | ICD-10-CM

## 2025-02-24 DIAGNOSIS — J44.9 CHRONIC OBSTRUCTIVE PULMONARY DISEASE, UNSPECIFIED COPD TYPE (MULTI): ICD-10-CM

## 2025-02-26 DIAGNOSIS — J44.9 CHRONIC OBSTRUCTIVE PULMONARY DISEASE, UNSPECIFIED COPD TYPE (MULTI): ICD-10-CM

## 2025-03-12 ENCOUNTER — APPOINTMENT (OUTPATIENT)
Dept: RADIOLOGY | Facility: HOSPITAL | Age: 80
DRG: 193 | End: 2025-03-12
Payer: MEDICARE

## 2025-03-12 ENCOUNTER — APPOINTMENT (OUTPATIENT)
Dept: CARDIOLOGY | Facility: HOSPITAL | Age: 80
DRG: 193 | End: 2025-03-12
Payer: MEDICARE

## 2025-03-12 ENCOUNTER — HOSPITAL ENCOUNTER (INPATIENT)
Facility: HOSPITAL | Age: 80
LOS: 1 days | Discharge: HOME HEALTH | DRG: 193 | End: 2025-03-14
Attending: EMERGENCY MEDICINE | Admitting: INTERNAL MEDICINE
Payer: MEDICARE

## 2025-03-12 DIAGNOSIS — J18.9 PNEUMONIA OF BOTH LOWER LOBES DUE TO INFECTIOUS ORGANISM: ICD-10-CM

## 2025-03-12 DIAGNOSIS — J44.1 COPD EXACERBATION (MULTI): Primary | ICD-10-CM

## 2025-03-12 DIAGNOSIS — J18.9 PNEUMONIA DUE TO INFECTIOUS ORGANISM, UNSPECIFIED LATERALITY, UNSPECIFIED PART OF LUNG: ICD-10-CM

## 2025-03-12 DIAGNOSIS — J96.21 ACUTE ON CHRONIC RESPIRATORY FAILURE WITH HYPOXIA (MULTI): ICD-10-CM

## 2025-03-12 LAB
ALBUMIN SERPL BCP-MCNC: 4.3 G/DL (ref 3.4–5)
ALP SERPL-CCNC: 60 U/L (ref 33–136)
ALT SERPL W P-5'-P-CCNC: 10 U/L (ref 10–52)
ANION GAP SERPL CALC-SCNC: 11 MMOL/L (ref 10–20)
APPEARANCE UR: CLEAR
APTT PPP: 24 SECONDS (ref 26–36)
AST SERPL W P-5'-P-CCNC: 14 U/L (ref 9–39)
ATRIAL RATE: 117 BPM
BASOPHILS # BLD AUTO: 0.02 X10*3/UL (ref 0–0.1)
BASOPHILS NFR BLD AUTO: 0.2 %
BILIRUB SERPL-MCNC: 1 MG/DL (ref 0–1.2)
BILIRUB UR STRIP.AUTO-MCNC: NEGATIVE MG/DL
BNP SERPL-MCNC: 26 PG/ML (ref 0–99)
BUN SERPL-MCNC: 15 MG/DL (ref 6–23)
CALCIUM SERPL-MCNC: 9.4 MG/DL (ref 8.6–10.3)
CARDIAC TROPONIN I PNL SERPL HS: 3 NG/L (ref 0–20)
CARDIAC TROPONIN I PNL SERPL HS: 4 NG/L (ref 0–20)
CHLORIDE SERPL-SCNC: 101 MMOL/L (ref 98–107)
CO2 SERPL-SCNC: 27 MMOL/L (ref 21–32)
COLOR UR: ABNORMAL
CREAT SERPL-MCNC: 0.88 MG/DL (ref 0.5–1.3)
D DIMER PPP FEU-MCNC: 1312 NG/ML FEU
EGFRCR SERPLBLD CKD-EPI 2021: 87 ML/MIN/1.73M*2
EOSINOPHIL # BLD AUTO: 0.04 X10*3/UL (ref 0–0.4)
EOSINOPHIL NFR BLD AUTO: 0.4 %
ERYTHROCYTE [DISTWIDTH] IN BLOOD BY AUTOMATED COUNT: 12.6 % (ref 11.5–14.5)
FLUAV RNA RESP QL NAA+PROBE: NOT DETECTED
FLUBV RNA RESP QL NAA+PROBE: NOT DETECTED
GLUCOSE SERPL-MCNC: 109 MG/DL (ref 74–99)
GLUCOSE UR STRIP.AUTO-MCNC: ABNORMAL MG/DL
HCT VFR BLD AUTO: 44 % (ref 41–52)
HGB BLD-MCNC: 15.2 G/DL (ref 13.5–17.5)
IMM GRANULOCYTES # BLD AUTO: 0.03 X10*3/UL (ref 0–0.5)
IMM GRANULOCYTES NFR BLD AUTO: 0.3 % (ref 0–0.9)
INR PPP: 1.1 (ref 0.9–1.1)
KETONES UR STRIP.AUTO-MCNC: NEGATIVE MG/DL
LEUKOCYTE ESTERASE UR QL STRIP.AUTO: NEGATIVE
LYMPHOCYTES # BLD AUTO: 0.6 X10*3/UL (ref 0.8–3)
LYMPHOCYTES NFR BLD AUTO: 6.4 %
MAGNESIUM SERPL-MCNC: 1.56 MG/DL (ref 1.6–2.4)
MCH RBC QN AUTO: 32.4 PG (ref 26–34)
MCHC RBC AUTO-ENTMCNC: 34.5 G/DL (ref 32–36)
MCV RBC AUTO: 94 FL (ref 80–100)
MONOCYTES # BLD AUTO: 0.32 X10*3/UL (ref 0.05–0.8)
MONOCYTES NFR BLD AUTO: 3.4 %
MRSA DNA SPEC QL NAA+PROBE: NOT DETECTED
NEUTROPHILS # BLD AUTO: 8.34 X10*3/UL (ref 1.6–5.5)
NEUTROPHILS NFR BLD AUTO: 89.3 %
NITRITE UR QL STRIP.AUTO: NEGATIVE
NRBC BLD-RTO: 0 /100 WBCS (ref 0–0)
P AXIS: 56 DEGREES
PH UR STRIP.AUTO: 6.5 [PH]
PLATELET # BLD AUTO: 165 X10*3/UL (ref 150–450)
POTASSIUM SERPL-SCNC: 4.2 MMOL/L (ref 3.5–5.3)
PR INTERVAL: 168 MS
PROT SERPL-MCNC: 8.3 G/DL (ref 6.4–8.2)
PROT UR STRIP.AUTO-MCNC: NEGATIVE MG/DL
PROTHROMBIN TIME: 12.1 SECONDS (ref 9.8–12.4)
Q ONSET: 216 MS
QRS COUNT: 20 BEATS
QRS DURATION: 74 MS
QT INTERVAL: 298 MS
QTC CALCULATION(BAZETT): 415 MS
QTC FREDERICIA: 372 MS
R AXIS: -14 DEGREES
RBC # BLD AUTO: 4.69 X10*6/UL (ref 4.5–5.9)
RBC # UR STRIP.AUTO: NEGATIVE MG/DL
RSV RNA RESP QL NAA+PROBE: NOT DETECTED
SARS-COV-2 RNA RESP QL NAA+PROBE: NOT DETECTED
SODIUM SERPL-SCNC: 135 MMOL/L (ref 136–145)
SP GR UR STRIP.AUTO: 1.03
T AXIS: 82 DEGREES
T OFFSET: 365 MS
UROBILINOGEN UR STRIP.AUTO-MCNC: NORMAL MG/DL
VENTRICULAR RATE: 117 BPM
WBC # BLD AUTO: 9.4 X10*3/UL (ref 4.4–11.3)

## 2025-03-12 PROCEDURE — 84484 ASSAY OF TROPONIN QUANT: CPT

## 2025-03-12 PROCEDURE — 87449 NOS EACH ORGANISM AG IA: CPT | Mod: STJLAB | Performed by: NURSE PRACTITIONER

## 2025-03-12 PROCEDURE — 71045 X-RAY EXAM CHEST 1 VIEW: CPT

## 2025-03-12 PROCEDURE — 2500000005 HC RX 250 GENERAL PHARMACY W/O HCPCS: Performed by: INTERNAL MEDICINE

## 2025-03-12 PROCEDURE — 96367 TX/PROPH/DG ADDL SEQ IV INF: CPT

## 2025-03-12 PROCEDURE — 71045 X-RAY EXAM CHEST 1 VIEW: CPT | Performed by: RADIOLOGY

## 2025-03-12 PROCEDURE — 81003 URINALYSIS AUTO W/O SCOPE: CPT

## 2025-03-12 PROCEDURE — G0378 HOSPITAL OBSERVATION PER HR: HCPCS

## 2025-03-12 PROCEDURE — 2500000002 HC RX 250 W HCPCS SELF ADMINISTERED DRUGS (ALT 637 FOR MEDICARE OP, ALT 636 FOR OP/ED): Performed by: INTERNAL MEDICINE

## 2025-03-12 PROCEDURE — 2500000004 HC RX 250 GENERAL PHARMACY W/ HCPCS (ALT 636 FOR OP/ED): Performed by: NURSE PRACTITIONER

## 2025-03-12 PROCEDURE — 2550000001 HC RX 255 CONTRASTS: Performed by: EMERGENCY MEDICINE

## 2025-03-12 PROCEDURE — 2500000004 HC RX 250 GENERAL PHARMACY W/ HCPCS (ALT 636 FOR OP/ED): Mod: JZ

## 2025-03-12 PROCEDURE — 96361 HYDRATE IV INFUSION ADD-ON: CPT

## 2025-03-12 PROCEDURE — 2500000004 HC RX 250 GENERAL PHARMACY W/ HCPCS (ALT 636 FOR OP/ED): Performed by: EMERGENCY MEDICINE

## 2025-03-12 PROCEDURE — 83880 ASSAY OF NATRIURETIC PEPTIDE: CPT

## 2025-03-12 PROCEDURE — 93005 ELECTROCARDIOGRAM TRACING: CPT

## 2025-03-12 PROCEDURE — 2500000002 HC RX 250 W HCPCS SELF ADMINISTERED DRUGS (ALT 637 FOR MEDICARE OP, ALT 636 FOR OP/ED)

## 2025-03-12 PROCEDURE — 85730 THROMBOPLASTIN TIME PARTIAL: CPT

## 2025-03-12 PROCEDURE — 71275 CT ANGIOGRAPHY CHEST: CPT

## 2025-03-12 PROCEDURE — 2500000001 HC RX 250 WO HCPCS SELF ADMINISTERED DRUGS (ALT 637 FOR MEDICARE OP): Performed by: NURSE PRACTITIONER

## 2025-03-12 PROCEDURE — 2500000005 HC RX 250 GENERAL PHARMACY W/O HCPCS: Performed by: NURSE PRACTITIONER

## 2025-03-12 PROCEDURE — 84075 ASSAY ALKALINE PHOSPHATASE: CPT

## 2025-03-12 PROCEDURE — 85610 PROTHROMBIN TIME: CPT

## 2025-03-12 PROCEDURE — 36415 COLL VENOUS BLD VENIPUNCTURE: CPT

## 2025-03-12 PROCEDURE — 96372 THER/PROPH/DIAG INJ SC/IM: CPT | Performed by: NURSE PRACTITIONER

## 2025-03-12 PROCEDURE — 2500000005 HC RX 250 GENERAL PHARMACY W/O HCPCS

## 2025-03-12 PROCEDURE — 71275 CT ANGIOGRAPHY CHEST: CPT | Performed by: RADIOLOGY

## 2025-03-12 PROCEDURE — 94640 AIRWAY INHALATION TREATMENT: CPT

## 2025-03-12 PROCEDURE — 2500000002 HC RX 250 W HCPCS SELF ADMINISTERED DRUGS (ALT 637 FOR MEDICARE OP, ALT 636 FOR OP/ED): Performed by: NURSE PRACTITIONER

## 2025-03-12 PROCEDURE — 87636 SARSCOV2 & INF A&B AMP PRB: CPT

## 2025-03-12 PROCEDURE — 96365 THER/PROPH/DIAG IV INF INIT: CPT | Mod: 59

## 2025-03-12 PROCEDURE — 87641 MR-STAPH DNA AMP PROBE: CPT

## 2025-03-12 PROCEDURE — 83735 ASSAY OF MAGNESIUM: CPT

## 2025-03-12 PROCEDURE — 96375 TX/PRO/DX INJ NEW DRUG ADDON: CPT

## 2025-03-12 PROCEDURE — 85025 COMPLETE CBC W/AUTO DIFF WBC: CPT

## 2025-03-12 PROCEDURE — 99285 EMERGENCY DEPT VISIT HI MDM: CPT | Mod: 25 | Performed by: EMERGENCY MEDICINE

## 2025-03-12 PROCEDURE — 85379 FIBRIN DEGRADATION QUANT: CPT

## 2025-03-12 PROCEDURE — 99222 1ST HOSP IP/OBS MODERATE 55: CPT | Performed by: INTERNAL MEDICINE

## 2025-03-12 PROCEDURE — 87899 AGENT NOS ASSAY W/OPTIC: CPT | Mod: STJLAB | Performed by: NURSE PRACTITIONER

## 2025-03-12 PROCEDURE — 99285 EMERGENCY DEPT VISIT HI MDM: CPT | Performed by: EMERGENCY MEDICINE

## 2025-03-12 RX ORDER — CEFTRIAXONE 1 G/50ML
1 INJECTION, SOLUTION INTRAVENOUS EVERY 24 HOURS
Status: DISCONTINUED | OUTPATIENT
Start: 2025-03-12 | End: 2025-03-14 | Stop reason: HOSPADM

## 2025-03-12 RX ORDER — IPRATROPIUM BROMIDE AND ALBUTEROL SULFATE 2.5; .5 MG/3ML; MG/3ML
9 SOLUTION RESPIRATORY (INHALATION) ONCE
Status: COMPLETED | OUTPATIENT
Start: 2025-03-12 | End: 2025-03-12

## 2025-03-12 RX ORDER — POLYETHYLENE GLYCOL 3350 17 G/17G
17 POWDER, FOR SOLUTION ORAL DAILY PRN
Status: DISCONTINUED | OUTPATIENT
Start: 2025-03-12 | End: 2025-03-14 | Stop reason: HOSPADM

## 2025-03-12 RX ORDER — ONDANSETRON 4 MG/1
4 TABLET, FILM COATED ORAL EVERY 8 HOURS PRN
Status: DISCONTINUED | OUTPATIENT
Start: 2025-03-12 | End: 2025-03-14 | Stop reason: HOSPADM

## 2025-03-12 RX ORDER — ACETAMINOPHEN 325 MG/1
650 TABLET ORAL EVERY 4 HOURS PRN
Status: DISCONTINUED | OUTPATIENT
Start: 2025-03-12 | End: 2025-03-14 | Stop reason: HOSPADM

## 2025-03-12 RX ORDER — IPRATROPIUM BROMIDE AND ALBUTEROL SULFATE 2.5; .5 MG/3ML; MG/3ML
3 SOLUTION RESPIRATORY (INHALATION)
Status: DISCONTINUED | OUTPATIENT
Start: 2025-03-12 | End: 2025-03-13

## 2025-03-12 RX ORDER — GUAIFENESIN 600 MG/1
600 TABLET, EXTENDED RELEASE ORAL 2 TIMES DAILY PRN
Status: DISCONTINUED | OUTPATIENT
Start: 2025-03-12 | End: 2025-03-14 | Stop reason: HOSPADM

## 2025-03-12 RX ORDER — FLUTICASONE FUROATE AND VILANTEROL 200; 25 UG/1; UG/1
1 POWDER RESPIRATORY (INHALATION)
Status: DISCONTINUED | OUTPATIENT
Start: 2025-03-12 | End: 2025-03-12

## 2025-03-12 RX ORDER — BUDESONIDE 0.5 MG/2ML
0.5 INHALANT ORAL
Status: DISCONTINUED | OUTPATIENT
Start: 2025-03-12 | End: 2025-03-14 | Stop reason: HOSPADM

## 2025-03-12 RX ORDER — MAGNESIUM SULFATE HEPTAHYDRATE 40 MG/ML
2 INJECTION, SOLUTION INTRAVENOUS ONCE
Status: COMPLETED | OUTPATIENT
Start: 2025-03-12 | End: 2025-03-12

## 2025-03-12 RX ORDER — ACETAMINOPHEN 500 MG
5 TABLET ORAL NIGHTLY PRN
Status: DISCONTINUED | OUTPATIENT
Start: 2025-03-12 | End: 2025-03-14 | Stop reason: HOSPADM

## 2025-03-12 RX ORDER — ONDANSETRON HYDROCHLORIDE 2 MG/ML
4 INJECTION, SOLUTION INTRAVENOUS EVERY 8 HOURS PRN
Status: DISCONTINUED | OUTPATIENT
Start: 2025-03-12 | End: 2025-03-14 | Stop reason: HOSPADM

## 2025-03-12 RX ORDER — DIPHENHYDRAMINE HYDROCHLORIDE 50 MG/ML
50 INJECTION, SOLUTION INTRAMUSCULAR; INTRAVENOUS ONCE
Status: COMPLETED | OUTPATIENT
Start: 2025-03-12 | End: 2025-03-12

## 2025-03-12 RX ORDER — FORMOTEROL FUMARATE 20 UG/2ML
20 SOLUTION RESPIRATORY (INHALATION)
Status: DISCONTINUED | OUTPATIENT
Start: 2025-03-12 | End: 2025-03-14 | Stop reason: HOSPADM

## 2025-03-12 RX ORDER — TALC
3 POWDER (GRAM) TOPICAL NIGHTLY PRN
Status: DISCONTINUED | OUTPATIENT
Start: 2025-03-12 | End: 2025-03-12

## 2025-03-12 RX ORDER — ALBUTEROL SULFATE 0.83 MG/ML
2.5 SOLUTION RESPIRATORY (INHALATION) EVERY 6 HOURS PRN
Status: DISCONTINUED | OUTPATIENT
Start: 2025-03-12 | End: 2025-03-14 | Stop reason: HOSPADM

## 2025-03-12 RX ORDER — ALBUTEROL SULFATE 90 UG/1
2 INHALANT RESPIRATORY (INHALATION) EVERY 4 HOURS PRN
COMMUNITY

## 2025-03-12 RX ORDER — ACETYLCYSTEINE 200 MG/ML
4 SOLUTION ORAL; RESPIRATORY (INHALATION)
Status: DISCONTINUED | OUTPATIENT
Start: 2025-03-12 | End: 2025-03-14 | Stop reason: HOSPADM

## 2025-03-12 RX ORDER — HEPARIN SODIUM 5000 [USP'U]/ML
5000 INJECTION, SOLUTION INTRAVENOUS; SUBCUTANEOUS EVERY 8 HOURS SCHEDULED
Status: DISCONTINUED | OUTPATIENT
Start: 2025-03-12 | End: 2025-03-14 | Stop reason: HOSPADM

## 2025-03-12 RX ORDER — VANCOMYCIN 2 GRAM/500 ML IN 0.9 % SODIUM CHLORIDE INTRAVENOUS
2 ONCE
Status: COMPLETED | OUTPATIENT
Start: 2025-03-12 | End: 2025-03-12

## 2025-03-12 RX ADMIN — BUDESONIDE 0.5 MG: 0.5 INHALANT RESPIRATORY (INHALATION) at 21:07

## 2025-03-12 RX ADMIN — Medication 4 L/MIN: at 10:07

## 2025-03-12 RX ADMIN — METHYLPREDNISOLONE SODIUM SUCCINATE 125 MG: 125 INJECTION, POWDER, FOR SOLUTION INTRAMUSCULAR; INTRAVENOUS at 10:26

## 2025-03-12 RX ADMIN — SODIUM CHLORIDE, POTASSIUM CHLORIDE, SODIUM LACTATE AND CALCIUM CHLORIDE 1000 ML: 600; 310; 30; 20 INJECTION, SOLUTION INTRAVENOUS at 12:08

## 2025-03-12 RX ADMIN — HEPARIN SODIUM 5000 UNITS: 5000 INJECTION, SOLUTION INTRAVENOUS; SUBCUTANEOUS at 21:49

## 2025-03-12 RX ADMIN — DIPHENHYDRAMINE HYDROCHLORIDE 50 MG: 50 INJECTION, SOLUTION INTRAMUSCULAR; INTRAVENOUS at 14:18

## 2025-03-12 RX ADMIN — FORMOTEROL FUMARATE 20 MCG: 20 SOLUTION RESPIRATORY (INHALATION) at 21:08

## 2025-03-12 RX ADMIN — IOHEXOL 60 ML: 350 INJECTION, SOLUTION INTRAVENOUS at 14:39

## 2025-03-12 RX ADMIN — SODIUM CHLORIDE, POTASSIUM CHLORIDE, SODIUM LACTATE AND CALCIUM CHLORIDE 1000 ML: 600; 310; 30; 20 INJECTION, SOLUTION INTRAVENOUS at 10:26

## 2025-03-12 RX ADMIN — ACETAMINOPHEN 650 MG: 325 TABLET ORAL at 20:00

## 2025-03-12 RX ADMIN — Medication 3 L/MIN: at 21:12

## 2025-03-12 RX ADMIN — IPRATROPIUM BROMIDE AND ALBUTEROL SULFATE 9 ML: 2.5; .5 SOLUTION RESPIRATORY (INHALATION) at 10:37

## 2025-03-12 RX ADMIN — CEFTRIAXONE SODIUM 1 G: 1 INJECTION, SOLUTION INTRAVENOUS at 23:12

## 2025-03-12 RX ADMIN — AZITHROMYCIN MONOHYDRATE 500 MG: 500 INJECTION, POWDER, LYOPHILIZED, FOR SOLUTION INTRAVENOUS at 16:44

## 2025-03-12 RX ADMIN — PIPERACILLIN SODIUM AND TAZOBACTAM SODIUM 4.5 G: 4; .5 INJECTION, SOLUTION INTRAVENOUS at 16:12

## 2025-03-12 RX ADMIN — IPRATROPIUM BROMIDE AND ALBUTEROL SULFATE 3 ML: 2.5; .5 SOLUTION RESPIRATORY (INHALATION) at 21:06

## 2025-03-12 RX ADMIN — GUAIFENESIN 600 MG: 600 TABLET ORAL at 17:32

## 2025-03-12 RX ADMIN — Medication 3 MG: at 20:01

## 2025-03-12 RX ADMIN — ACETYLCYSTEINE 800 MG: 200 SOLUTION ORAL; RESPIRATORY (INHALATION) at 21:05

## 2025-03-12 RX ADMIN — HEPARIN SODIUM 5000 UNITS: 5000 INJECTION, SOLUTION INTRAVENOUS; SUBCUTANEOUS at 17:32

## 2025-03-12 RX ADMIN — Medication 2 G: at 19:12

## 2025-03-12 RX ADMIN — MAGNESIUM SULFATE HEPTAHYDRATE 2 G: 40 INJECTION, SOLUTION INTRAVENOUS at 10:27

## 2025-03-12 SDOH — SOCIAL STABILITY: SOCIAL INSECURITY: DOES ANYONE TRY TO KEEP YOU FROM HAVING/CONTACTING OTHER FRIENDS OR DOING THINGS OUTSIDE YOUR HOME?: NO

## 2025-03-12 SDOH — ECONOMIC STABILITY: FOOD INSECURITY: WITHIN THE PAST 12 MONTHS, YOU WORRIED THAT YOUR FOOD WOULD RUN OUT BEFORE YOU GOT THE MONEY TO BUY MORE.: NEVER TRUE

## 2025-03-12 SDOH — SOCIAL STABILITY: SOCIAL INSECURITY: DO YOU FEEL UNSAFE GOING BACK TO THE PLACE WHERE YOU ARE LIVING?: NO

## 2025-03-12 SDOH — ECONOMIC STABILITY: INCOME INSECURITY: IN THE PAST 12 MONTHS HAS THE ELECTRIC, GAS, OIL, OR WATER COMPANY THREATENED TO SHUT OFF SERVICES IN YOUR HOME?: NO

## 2025-03-12 SDOH — SOCIAL STABILITY: SOCIAL INSECURITY
WITHIN THE LAST YEAR, HAVE YOU BEEN KICKED, HIT, SLAPPED, OR OTHERWISE PHYSICALLY HURT BY YOUR PARTNER OR EX-PARTNER?: NO

## 2025-03-12 SDOH — SOCIAL STABILITY: SOCIAL INSECURITY
WITHIN THE LAST YEAR, HAVE YOU BEEN RAPED OR FORCED TO HAVE ANY KIND OF SEXUAL ACTIVITY BY YOUR PARTNER OR EX-PARTNER?: NO

## 2025-03-12 SDOH — SOCIAL STABILITY: SOCIAL INSECURITY: WITHIN THE LAST YEAR, HAVE YOU BEEN AFRAID OF YOUR PARTNER OR EX-PARTNER?: NO

## 2025-03-12 SDOH — SOCIAL STABILITY: SOCIAL INSECURITY: ARE THERE ANY APPARENT SIGNS OF INJURIES/BEHAVIORS THAT COULD BE RELATED TO ABUSE/NEGLECT?: NO

## 2025-03-12 SDOH — SOCIAL STABILITY: SOCIAL INSECURITY: HAVE YOU HAD ANY THOUGHTS OF HARMING ANYONE ELSE?: NO

## 2025-03-12 SDOH — ECONOMIC STABILITY: FOOD INSECURITY: WITHIN THE PAST 12 MONTHS, THE FOOD YOU BOUGHT JUST DIDN'T LAST AND YOU DIDN'T HAVE MONEY TO GET MORE.: NEVER TRUE

## 2025-03-12 SDOH — SOCIAL STABILITY: SOCIAL INSECURITY: ARE YOU OR HAVE YOU BEEN THREATENED OR ABUSED PHYSICALLY, EMOTIONALLY, OR SEXUALLY BY ANYONE?: NO

## 2025-03-12 SDOH — SOCIAL STABILITY: SOCIAL INSECURITY: WERE YOU ABLE TO COMPLETE ALL THE BEHAVIORAL HEALTH SCREENINGS?: YES

## 2025-03-12 SDOH — SOCIAL STABILITY: SOCIAL INSECURITY: HAVE YOU HAD THOUGHTS OF HARMING ANYONE ELSE?: NO

## 2025-03-12 SDOH — SOCIAL STABILITY: SOCIAL INSECURITY: HAS ANYONE EVER THREATENED TO HURT YOUR FAMILY OR YOUR PETS?: NO

## 2025-03-12 SDOH — SOCIAL STABILITY: SOCIAL INSECURITY: WITHIN THE LAST YEAR, HAVE YOU BEEN HUMILIATED OR EMOTIONALLY ABUSED IN OTHER WAYS BY YOUR PARTNER OR EX-PARTNER?: NO

## 2025-03-12 SDOH — SOCIAL STABILITY: SOCIAL INSECURITY: ABUSE: ADULT

## 2025-03-12 SDOH — SOCIAL STABILITY: SOCIAL INSECURITY: DO YOU FEEL ANYONE HAS EXPLOITED OR TAKEN ADVANTAGE OF YOU FINANCIALLY OR OF YOUR PERSONAL PROPERTY?: NO

## 2025-03-12 ASSESSMENT — COGNITIVE AND FUNCTIONAL STATUS - GENERAL
WALKING IN HOSPITAL ROOM: A LOT
MOVING FROM LYING ON BACK TO SITTING ON SIDE OF FLAT BED WITH BEDRAILS: A LITTLE
PATIENT BASELINE BEDBOUND: NO
TOILETING: A LITTLE
DRESSING REGULAR LOWER BODY CLOTHING: A LITTLE
MOVING FROM LYING ON BACK TO SITTING ON SIDE OF FLAT BED WITH BEDRAILS: A LITTLE
STANDING UP FROM CHAIR USING ARMS: A LITTLE
EATING MEALS: A LITTLE
DRESSING REGULAR LOWER BODY CLOTHING: A LITTLE
WALKING IN HOSPITAL ROOM: A LOT
CLIMB 3 TO 5 STEPS WITH RAILING: A LOT
WALKING IN HOSPITAL ROOM: A LOT
DRESSING REGULAR UPPER BODY CLOTHING: A LITTLE
MOVING TO AND FROM BED TO CHAIR: A LITTLE
EATING MEALS: A LITTLE
PERSONAL GROOMING: A LITTLE
STANDING UP FROM CHAIR USING ARMS: A LITTLE
TURNING FROM BACK TO SIDE WHILE IN FLAT BAD: A LITTLE
DRESSING REGULAR UPPER BODY CLOTHING: A LITTLE
TOILETING: A LITTLE
DRESSING REGULAR LOWER BODY CLOTHING: A LITTLE
HELP NEEDED FOR BATHING: A LITTLE
TURNING FROM BACK TO SIDE WHILE IN FLAT BAD: A LITTLE
PERSONAL GROOMING: A LITTLE
HELP NEEDED FOR BATHING: A LITTLE
MOVING TO AND FROM BED TO CHAIR: A LITTLE
MOBILITY SCORE: 16
TOILETING: A LITTLE
MOVING TO AND FROM BED TO CHAIR: A LITTLE
DAILY ACTIVITIY SCORE: 18
EATING MEALS: A LITTLE
CLIMB 3 TO 5 STEPS WITH RAILING: A LOT
STANDING UP FROM CHAIR USING ARMS: A LITTLE
MOVING FROM LYING ON BACK TO SITTING ON SIDE OF FLAT BED WITH BEDRAILS: A LITTLE
CLIMB 3 TO 5 STEPS WITH RAILING: A LOT
DAILY ACTIVITIY SCORE: 18
HELP NEEDED FOR BATHING: A LITTLE
PERSONAL GROOMING: A LITTLE
TURNING FROM BACK TO SIDE WHILE IN FLAT BAD: A LITTLE
MOBILITY SCORE: 16
DAILY ACTIVITIY SCORE: 18
DRESSING REGULAR UPPER BODY CLOTHING: A LITTLE
MOBILITY SCORE: 16

## 2025-03-12 ASSESSMENT — ACTIVITIES OF DAILY LIVING (ADL)
BATHING: NEEDS ASSISTANCE
ADEQUATE_TO_COMPLETE_ADL: YES
GROOMING: NEEDS ASSISTANCE
HEARING - RIGHT EAR: FUNCTIONAL
HEARING - LEFT EAR: FUNCTIONAL
JUDGMENT_ADEQUATE_SAFELY_COMPLETE_DAILY_ACTIVITIES: YES
WALKS IN HOME: NEEDS ASSISTANCE
DRESSING YOURSELF: NEEDS ASSISTANCE
LACK_OF_TRANSPORTATION: NO
TOILETING: NEEDS ASSISTANCE
FEEDING YOURSELF: NEEDS ASSISTANCE
PATIENT'S MEMORY ADEQUATE TO SAFELY COMPLETE DAILY ACTIVITIES?: YES

## 2025-03-12 ASSESSMENT — LIFESTYLE VARIABLES
TOTAL SCORE: 0
SKIP TO QUESTIONS 9-10: 1
EVER FELT BAD OR GUILTY ABOUT YOUR DRINKING: NO
HAVE YOU EVER FELT YOU SHOULD CUT DOWN ON YOUR DRINKING: NO
AUDIT-C TOTAL SCORE: 2
HOW OFTEN DO YOU HAVE 6 OR MORE DRINKS ON ONE OCCASION: NEVER
EVER HAD A DRINK FIRST THING IN THE MORNING TO STEADY YOUR NERVES TO GET RID OF A HANGOVER: NO
HOW MANY STANDARD DRINKS CONTAINING ALCOHOL DO YOU HAVE ON A TYPICAL DAY: 1 OR 2
AUDIT-C TOTAL SCORE: 2
HAVE PEOPLE ANNOYED YOU BY CRITICIZING YOUR DRINKING: NO
HOW OFTEN DO YOU HAVE A DRINK CONTAINING ALCOHOL: 2-4 TIMES A MONTH

## 2025-03-12 ASSESSMENT — ENCOUNTER SYMPTOMS
FEVER: 0
PALPITATIONS: 0
COUGH: 1
GASTROINTESTINAL NEGATIVE: 1
CHILLS: 1
MUSCULOSKELETAL NEGATIVE: 1
ACTIVITY CHANGE: 0
NEUROLOGICAL NEGATIVE: 1
PSYCHIATRIC NEGATIVE: 1
SHORTNESS OF BREATH: 1

## 2025-03-12 ASSESSMENT — PAIN SCALES - GENERAL
PAINLEVEL_OUTOF10: 0 - NO PAIN
PAINLEVEL_OUTOF10: 0 - NO PAIN
PAINLEVEL_OUTOF10: 4
PAINLEVEL_OUTOF10: 0 - NO PAIN

## 2025-03-12 ASSESSMENT — PAIN - FUNCTIONAL ASSESSMENT
PAIN_FUNCTIONAL_ASSESSMENT: 0-10

## 2025-03-12 ASSESSMENT — PATIENT HEALTH QUESTIONNAIRE - PHQ9
1. LITTLE INTEREST OR PLEASURE IN DOING THINGS: NOT AT ALL
SUM OF ALL RESPONSES TO PHQ9 QUESTIONS 1 & 2: 0
2. FEELING DOWN, DEPRESSED OR HOPELESS: NOT AT ALL

## 2025-03-12 ASSESSMENT — PAIN DESCRIPTION - LOCATION: LOCATION: HEAD

## 2025-03-12 ASSESSMENT — COLUMBIA-SUICIDE SEVERITY RATING SCALE - C-SSRS
2. HAVE YOU ACTUALLY HAD ANY THOUGHTS OF KILLING YOURSELF?: NO
1. IN THE PAST MONTH, HAVE YOU WISHED YOU WERE DEAD OR WISHED YOU COULD GO TO SLEEP AND NOT WAKE UP?: NO
6. HAVE YOU EVER DONE ANYTHING, STARTED TO DO ANYTHING, OR PREPARED TO DO ANYTHING TO END YOUR LIFE?: NO

## 2025-03-12 NOTE — H&P
History Of Present Illness  Sindhu Salazar is a 79 y.o. male with history of Prostate Cancer, Basal cell carcinoma of the face, and COPD on 3 lpm nasal cannula at night,  who is a former smoker (~75 pack years) presents to [facility] ER with chief complaints of cough and shortness of breath. Patient states he has chronic cough but worse over the past several days. Patient states he felt more short of breath today after waking up. He denies any chest pain, abd pain, fever, nausea or vomiting, endorses chills today. He was found to be requiring 3 lpm nasal cannula on arrival to ED with oxygen sats in low 80's. CTA negative of acute PE, however, indicating bilateral lower lobe pneumonia. He was started on IV antibiotics. He was also tachycardic in ED, received 2 L of IV fluids and was fluid responsive. He will be admitted for acute on chronic respiratory failure and pneumonia.      Past Medical History   COPD (chronic obstructive pulmonary disease) (HCC) 2016    Hearing loss Right side    Hyperplasia of prostate    Non-melanoma skin cancer   removed 20 years ago     Surgical History   AMP / JT/PHALANX W/NEURECT W/DIR CLSR     PROSTATE NEEDLE BIOPSY ANY APPROACH 01   Transrectal bx, prostate    SIGMOIDOSCOPY FLX DX W/COLLJ SPEC BR/WA IF PFRMD    Sigmoidoscopy    TYMPANIC MEMB RPR W/WO PREPJ PERFOR PATCH   Tympanoplasty-tumor removed    Social History   Smoking status: Former Smoker   Packs/day: 3.00   Years: 30.00   Pack years: 90.00   Types: Cigarettes    Smokeless tobacco: Never Used    Tobacco comment: QUIT AT AGE 45    Alcohol use: Yes   Alcohol/week: 2.5 standard drinks   Types: 1 Glasses of Wine (5oz) per week   Comment: Ocassional    Drug use: No   Family History   other (Other) Mother    AT AGE 81 - AUTOMIBLE ACCIDENT    Heart Father    AT AGE 86 - HEART , DEMENTIA, PROSTATE PROBLEMS    other (Other) Brother   IN GOOD HEALTH      Allergies  Iodinated contrast media,  "Iodine, and Sulfa (sulfonamide antibiotics)    Review of Systems   Constitutional:  Positive for chills. Negative for activity change and fever.   HENT: Negative.     Respiratory:  Positive for cough and shortness of breath.    Cardiovascular:  Negative for chest pain, palpitations and leg swelling.   Gastrointestinal: Negative.    Genitourinary: Negative.    Musculoskeletal: Negative.    Skin: Negative.    Neurological: Negative.    Psychiatric/Behavioral: Negative.        ROS: 12 systems reviewed and negative except per HPI above     Physical Exam by System:     Constitutional: Well developed, awake/alert/oriented x3, no distress, alert and cooperative   ENMT: mucous membranes moist   Head/Neck: Neck supple   Respiratory/Thorax: Patent airways, CTAB, normal breath sounds with good chest expansion, no wheezing, no rales, no rhonchi thorax symmetric, on 2 lpm nasal cannula   Cardiovascular: Regular, rate and rhythm, no murmurs, 2+ equal pulses of the extremities, normal S 1and S 2   Gastrointestinal: Nondistended, soft, non-tender, no rebound tenderness or guarding, no masses palpable, no organomegaly, +BS, no bruits   Musculoskeletal: ROM intact, no joint swelling, normal strength   Extremities: normal extremities, no cyanosis edema, contusions or wounds, no clubbing   Neurological: alert and oriented x3, intact senses, motor, response and reflexes, normal strength       Last Recorded Vitals  Blood pressure 110/66, pulse 97, temperature 37.5 °C (99.5 °F), temperature source Temporal, resp. rate 20, height 1.676 m (5' 6\"), weight 79.4 kg (175 lb), SpO2 96%.    Relevant Results  Results for orders placed or performed during the hospital encounter of 03/12/25 (from the past 24 hours)   ECG 12 lead   Result Value Ref Range    Ventricular Rate 117 BPM    Atrial Rate 117 BPM    TX Interval 168 ms    QRS Duration 74 ms    QT Interval 298 ms    QTC Calculation(Bazett) 415 ms    P Axis 56 degrees    R Axis -14 degrees    T " Axis 82 degrees    QRS Count 20 beats    Q Onset 216 ms    T Offset 365 ms    QTC Fredericia 372 ms   CBC and Auto Differential   Result Value Ref Range    WBC 9.4 4.4 - 11.3 x10*3/uL    nRBC 0.0 0.0 - 0.0 /100 WBCs    RBC 4.69 4.50 - 5.90 x10*6/uL    Hemoglobin 15.2 13.5 - 17.5 g/dL    Hematocrit 44.0 41.0 - 52.0 %    MCV 94 80 - 100 fL    MCH 32.4 26.0 - 34.0 pg    MCHC 34.5 32.0 - 36.0 g/dL    RDW 12.6 11.5 - 14.5 %    Platelets 165 150 - 450 x10*3/uL    Neutrophils % 89.3 40.0 - 80.0 %    Immature Granulocytes %, Automated 0.3 0.0 - 0.9 %    Lymphocytes % 6.4 13.0 - 44.0 %    Monocytes % 3.4 2.0 - 10.0 %    Eosinophils % 0.4 0.0 - 6.0 %    Basophils % 0.2 0.0 - 2.0 %    Neutrophils Absolute 8.34 (H) 1.60 - 5.50 x10*3/uL    Immature Granulocytes Absolute, Automated 0.03 0.00 - 0.50 x10*3/uL    Lymphocytes Absolute 0.60 (L) 0.80 - 3.00 x10*3/uL    Monocytes Absolute 0.32 0.05 - 0.80 x10*3/uL    Eosinophils Absolute 0.04 0.00 - 0.40 x10*3/uL    Basophils Absolute 0.02 0.00 - 0.10 x10*3/uL   Magnesium   Result Value Ref Range    Magnesium 1.56 (L) 1.60 - 2.40 mg/dL   Comprehensive metabolic panel   Result Value Ref Range    Glucose 109 (H) 74 - 99 mg/dL    Sodium 135 (L) 136 - 145 mmol/L    Potassium 4.2 3.5 - 5.3 mmol/L    Chloride 101 98 - 107 mmol/L    Bicarbonate 27 21 - 32 mmol/L    Anion Gap 11 10 - 20 mmol/L    Urea Nitrogen 15 6 - 23 mg/dL    Creatinine 0.88 0.50 - 1.30 mg/dL    eGFR 87 >60 mL/min/1.73m*2    Calcium 9.4 8.6 - 10.3 mg/dL    Albumin 4.3 3.4 - 5.0 g/dL    Alkaline Phosphatase 60 33 - 136 U/L    Total Protein 8.3 (H) 6.4 - 8.2 g/dL    AST 14 9 - 39 U/L    Bilirubin, Total 1.0 0.0 - 1.2 mg/dL    ALT 10 10 - 52 U/L   Protime-INR   Result Value Ref Range    Protime 12.1 9.8 - 12.4 seconds    INR 1.1 0.9 - 1.1   aPTT   Result Value Ref Range    aPTT 24 (L) 26 - 36 seconds   B-Type Natriuretic Peptide   Result Value Ref Range    BNP 26 0 - 99 pg/mL   D-Dimer, VTE Exclusion   Result Value Ref Range     D-Dimer, Quantitative VTE Exclusion 1,312 (H) <=500 ng/mL FEU   Troponin I, High Sensitivity, Initial   Result Value Ref Range    Troponin I, High Sensitivity 3 0 - 20 ng/L   Sars-CoV-2 and Influenza A/B PCR   Result Value Ref Range    Flu A Result Not Detected Not Detected    Flu B Result Not Detected Not Detected    Coronavirus 2019, PCR Not Detected Not Detected   RSV PCR   Result Value Ref Range    RSV PCR Not Detected Not Detected   Troponin, High Sensitivity, 1 Hour   Result Value Ref Range    Troponin I, High Sensitivity 4 0 - 20 ng/L      Scheduled medications  azithromycin, 500 mg, intravenous, Once  piperacillin-tazobactam, 4.5 g, intravenous, Once  vancomycin, 2 g, intravenous, Once      Continuous medications     PRN medications  PRN medications: oxygen     CT angio chest for pulmonary embolism    Result Date: 3/12/2025  Interpreted By:  Marbin Sewell, STUDY: CT ANGIO CHEST FOR PULMONARY EMBOLISM; 3/12/2025 2:49 pm   INDICATION: Signs/Symptoms:Elevated D-dimer, tachycardia, shortness of breath, low oxygen saturation.   COMPARISON: None.   ACCESSION NUMBER(S): [id number]   ORDERING CLINICIAN: THIAGO HIGUERA   TECHNIQUE: 75 ml of non-ionic iodinated contrast was injected intravenously.   CT angiography (non-coronary) of the chest was performed with Intravenous contrast material along with 3D (maximum intensity projection - MIP) image post processing.   One or more of the following dose reduction techniques were used: Automated exposure control Adjustment of the mA and/or kV according to patient size, and/or use of iterative reconstruction technique.   FINDINGS: There is no evidence for aortic dissection or pericardial effusion. There is no evidence for pulmonary embolism.   There is extensive emphysematous change of the pulmonary parenchyma bilaterally. This is most severe within the upper lobes. There is alveolar consolidation within the posterior segments of both right lower lobe and left lower  lobe. There is bandlike atelectasis within the right middle lobe and lingula. No effusions are identified.   Chest Wall: No chest wall abnormalities are identified.   Upper Abdominal Findings: No pathologic findings are identified involving the visualized portions of the upper abdomen.   Skeletal System: There is a 5.3 cm hiatal hernia. There is a left upper quadrant accessory splenule.       1. No evidence for pulmonary embolism. 2. Bilateral lower lobe areas of consolidation are identified consistent with pneumonia. Follow-up to assure clearing is recommended. 3. Right middle lobe and lingular subsegmental atelectasis is present. 4. Severe emphysematous changes of the pulmonary parenchyma is present.   MACRO: none   Signed by: Marbin Sewell 3/12/2025 3:00 PM Dictation workstation:   [id number]    XR chest 1 view    Result Date: 3/12/2025  Interpreted By:  Marbin Sewell, STUDY: XR CHEST 1 VIEW; 3/12/2025 10:40 am   INDICATION: CLINICAL INFORMATION: Signs/Symptoms:Shortness of breath, hypoxia.   COMPARISON: CT chest 12/23/2024   ACCESSION NUMBER(S): [id number]   ORDERING CLINICIAN: BENITO BRYANT   TECHNIQUE: Portable chest one view.   FINDINGS: The cardiac size is indeterminate in view of the AP projection. Severe emphysematous change of the pulmonary parenchyma is present bilaterally, most marked within the right upper lobe. Patchy bibasilar infiltrate is present. Infiltrate appears new compared to the prior CT. No effusions are identified. The aorta is tortuous.       Patchy bibasilar infiltrate is present. Severe COPD.   MACRO: none   Signed by: Marbin Sewell 3/12/2025 11:04 AM Dictation workstation:   [id number]    ECG 12 lead    Result Date: 3/12/2025  Sinus tachycardia with occasional Premature ventricular complexes Otherwise normal ECG No previous ECGs available       Assessment/Plan     Pneumonia, CAP  Acute on chronic hypoxic respiratory failure, wears 3 lpm nasal cannula at night  COPD exacerbation,  History of COPD emphysema  Hypomagnesemia  Elevated d dimer, rule out PE      Plan:    -Admit to obs, acute care, with tele and cont pulse ox monitoring  -Influenza A B Covid and RSV negative  -CTA chest-No evidence for pulmonary embolism. Bilateral lower lobe areas of consolidation are identified consistent with pneumonia.  -Wears 3 lpm nasal cannula at night and is currently on 2 lpm nasal cannula, wean as tolerated  -WBC 9.4  -Cont with Azithromycin and start Rocephin  -Duo nebs and albuterol PRN  -Legionella and urine strep antigen  -Incentive spirometer  -Mucinex BID  -Solu medrol 40 mg IV BID starting tomorrow received 125 mg IV  -am labs including cbc, bmp, mag  -resume patient medications  -POC discussed with patient and attending  -dvtp: heparin subcutaneous   -Dispo: likely require less than 2 midnight stays to adequately treat and safe dc plan, to home  -Follow up with pulmonology Dr. Delgado outpatient    Code status:  Full Code    I spent >50 minutes in the professional and overall care of this patient.    SIGNATURE: MEGAN Mccall-CNP PATIENT NAME: Sindhu Salazar   DATE: March 12, 2025 MRN: [id number]   TIME: 4:17 PM

## 2025-03-12 NOTE — CARE PLAN
The patient's goals for the shift include      The clinical goals for the shift include monitor vitals, safety, pain control, no cough      Problem: Pain - Adult  Goal: Verbalizes/displays adequate comfort level or baseline comfort level  Outcome: Progressing     Problem: Safety - Adult  Goal: Free from fall injury  Outcome: Progressing     Problem: Discharge Planning  Goal: Discharge to home or other facility with appropriate resources  Outcome: Progressing     Problem: Chronic Conditions and Co-morbidities  Goal: Patient's chronic conditions and co-morbidity symptoms are monitored and maintained or improved  Outcome: Progressing     Problem: Nutrition  Goal: Nutrient intake appropriate for maintaining nutritional needs  Outcome: Progressing     Problem: Skin  Goal: Decreased wound size/increased tissue granulation at next dressing change  Outcome: Progressing  Goal: Participates in plan/prevention/treatment measures  Outcome: Progressing  Goal: Prevent/manage excess moisture  Outcome: Progressing  Flowsheets (Taken 3/12/2025 1729)  Prevent/manage excess moisture:   Cleanse incontinence/protect with barrier cream   Follow provider orders for dressing changes   Moisturize dry skin  Goal: Prevent/minimize sheer/friction injuries  Outcome: Progressing  Goal: Promote/optimize nutrition  Outcome: Progressing  Goal: Promote skin healing  Outcome: Progressing

## 2025-03-12 NOTE — ED PROVIDER NOTES
Emergency Department Provider Note        History of Present Illness     History provided by: Patient and Family Member  Limitations to History: None  External Records Reviewed with Brief Summary:  Medical chart review, including recent pulmonology notes which showed the patient is currently taking Dulera as well as Spiriva, follows with Dr. Delgado for COPD    HPI:  Sindhu Salazar is a 79 y.o. male arrives to the emergency department with a chief complaint of having woken up this morning with shortness of breath, patient states that he has had contact with COVID-positive patient his son-in-law a week and a half ago, he is accompanied by his wife who supplies additional medical information.  Patient states he is a former smoker quit in 1990, has history of COPD with emphysematous changes have his stents, only medications the patient recalls taking her Dulera and Spiriva, he does use 3 L nasal cannula O2 at night only however does not use it during the day.  Does have chronic cough, he denies fever, denies chills, denies history of stroke or myocardial infarction, he denies chest pain however he does endorse shortness of breath.  He states he that he did take his Dulera this morning.  He does follow with Dr. Delgado, pulmonologist at this facility.  Additionally he states he has a contrast dye allergy however his wife states it has been several decades since he received contrast dye.  Additionally he has allergy to sulfonamide antibiotics.    Physical Exam   Triage vitals:  T 37.4 °C (99.3 °F)  HR (!) 122  /78  RR    O2 (!) 82 % None (Room air)    General: Awake, alert, in no acute distress  Eyes: Gaze conjugate.  No scleral icterus or injection  HENT: Normo-cephalic, atraumatic. No stridor  CV: Regular rate, regular rhythm. Radial pulses 2+ bilaterally  Resp: Breathing non-labored, speaking in full sentences.  Diminished breath sounds to auscultation bilaterally  GI: Soft, non-distended, non-tender. No rebound or  guarding.  : Deferred  MSK/Extremities: No gross bony deformities. Moving all extremities  Skin: Warm. Appropriate color  Neuro: Alert. Oriented. Face symmetric. Speech is fluent.  Gross strength and sensation intact in b/l UE and LEs  Psych: Appropriate mood and affect    Medical Decision Making & ED Course   Medical Decision Makin y.o. male arrives chief complaint of shortness of breath since this morning, around and took a dose of his Dulera, patient reportedly wears 3 L nasal cannula at night.  Was found to be saturating 88% and tachycardic on arrival to the emergency department.  Twelve-lead EKG, CBC, CMP, magnesium, BNP, PT/INR/APTT, influenza COVID/RSV, troponin with delta ordered for evaluation of shortness of breath causes to rule out cardiac involvement.  Chest x-ray to evaluate pulmonal logical sources.  Due to tachycardia and hypoxia a D-dimer was ordered on the patient, came back elevated at 1312, patient additionally was given a dose of 125 Solu-Medrol, 2 g of magnesium, 3 consecutive DuoNeb treatments.  Due to elevated D-dimer tachycardia and hypoxia patient was sent for CT angio of the chest with pulmonary embolism after 5 hours steroid and Benadryl prep.  CT angio of the chest came back as negative for pulmonary embolism however was positive for pneumonia, he was started on antibiotic treatments, amenable for admission to the hospital.  Patient ultimately admitted for treatment of pneumonia in setting of COPD and increased emphysematous changes.  ----      Differential diagnoses considered include but are not limited to: ACS, COPD exacerbation, COPD exacerbated by acute viral illness or exposure, pulmonary embolism     Social Determinants of Health which Significantly Impact Care: None identified     EKG Independent Interpretation:  Twelve-lead ECG is interpreted by me shows sinus tachycardia at a rate of 117 bpm with occasional monomorphic premature ventricular contractions, 2 noted on this  EKG, , QTc 415, no acute injury pattern no high-grade AV terrence blocks, no S1Q3T3 pattern    Independent Result Review and Interpretation: Relevant laboratory and radiographic results were reviewed and independently interpreted by myself.  As necessary, they are commented on in the ED Course.    Chronic conditions affecting the patient's care: As documented above in [facility]    The patient was discussed with the following consultants/services: Hospitalist/Admitting Provider who accepted the patient for admission    Care Considerations: As documented above in [facility]    ED Course:  ED Course as of 03/12/25 1634   Wed Mar 12, 2025   1017 ECG 12 lead  ECG, obtained due to dyspnea, per my read, with sinus rhythm with PVCs, tachycardic heart rate 117 bpm, normal axis and intervals, no T wave inversions, no ST segment abnormalities. No priors for comparison. [MB]   1120 D-Dimer, Quantitative VTE Exclusion(!): 1,312  D-dimer is elevated, patient reportedly has iodinated contrast dye, patient's wife at bedside states last time the patient received iodide contrast was approximately 50 years ago however there have been no recent contrast dye administrations.  Due to concern for pulmonary embolism elevated D-dimer tachycardia and hypoxia a 5-hour steroid prep with methylprednisolone and Benadryl was ordered in preparation for CT angio chest to rule out pulmonary embolism [PV]   1123 Troponin I, High Sensitivity: 3 [PV]   1124 RSV PCR: Not Detected [PV]   1124 Flu A Result: Not Detected [PV]   1124 Coronavirus 2019, PCR: Not Detected [PV]   1124 Flu B Result: Not Detected   patient is negative for influenza, COVID, RSV, lung sounds improved after administration of 3 consecutive DuoNeb nebulized treatments magnesium and Solu-Medrol. [PV]   1125 XR chest 1 view  Patchy bibasilar infiltrate is present. [PV]   1125 No leukocytosis to indicate systemic infection at this time.  Magnesium 1.56, slightly hypomagnesemic however the patient  already received 2 g magnesium for treatment of COPD exacerbation suspicion.  BNP within normal limits rule out CHF.  CMP shows no other acute electrolyte dyscrasias, normal renal and hepatic function [PV]   1125  CHF exacerbation [PV]   1301 Delta troponin also negative, troponins are 3 and 4, EKG shows no acute injury pattern, BNP 26, low suspicion of ACS.  Second liter of fluids were ordered for the patient due to persistent tachycardia, suspect insensible fluid loss due to chronic respiratory issues [PV]   1605 CT angio chest for pulmonary embolism  1. No evidence for pulmonary embolism.  2. Bilateral lower lobe areas of consolidation are identified  consistent with pneumonia. Follow-up to assure clearing is  recommended.  3. Right middle lobe and lingular subsegmental atelectasis is present.  4. Severe emphysematous changes of the pulmonary parenchyma is  present.   [PV]   1605 Patient started on antibiotic treatment for pneumonia.  We discussed admission to the hospital for further treatment of pneumonia and acute on chronic respiratory failure with COPD exacerbation, patient is wife amenable to admission. [PV]      ED Course User Index  [MB] Darlene Luther MD  [PV] Brian Youngblood DO         Diagnoses as of 03/12/25 1634   COPD exacerbation (Multi)   Pneumonia due to infectious organism, unspecified laterality, unspecified part of lung   Acute on chronic respiratory failure with hypoxia (Multi)     Disposition   As a result of their workup, the patient will require admission to the hospital.  The patient was informed of his diagnosis.  The patient was given the opportunity to ask questions and I answered them. The patient agreed to be admitted to the hospital.    Procedures   Procedures    Patient seen and discussed with ED attending physician.    Brian Youngblood DO  Emergency Medicine     Brian Youngblood DO  Resident  03/12/25 1638

## 2025-03-13 LAB
ANION GAP SERPL CALC-SCNC: 10 MMOL/L (ref 10–20)
BUN SERPL-MCNC: 18 MG/DL (ref 6–23)
CALCIUM SERPL-MCNC: 8.3 MG/DL (ref 8.6–10.3)
CHLORIDE SERPL-SCNC: 107 MMOL/L (ref 98–107)
CO2 SERPL-SCNC: 26 MMOL/L (ref 21–32)
CREAT SERPL-MCNC: 0.92 MG/DL (ref 0.5–1.3)
EGFRCR SERPLBLD CKD-EPI 2021: 85 ML/MIN/1.73M*2
ERYTHROCYTE [DISTWIDTH] IN BLOOD BY AUTOMATED COUNT: 12.8 % (ref 11.5–14.5)
GLUCOSE SERPL-MCNC: 120 MG/DL (ref 74–99)
HCT VFR BLD AUTO: 36.2 % (ref 41–52)
HGB BLD-MCNC: 12.2 G/DL (ref 13.5–17.5)
HOLD SPECIMEN: NORMAL
LEGIONELLA AG UR QL: NEGATIVE
MAGNESIUM SERPL-MCNC: 2.09 MG/DL (ref 1.6–2.4)
MCH RBC QN AUTO: 32.6 PG (ref 26–34)
MCHC RBC AUTO-ENTMCNC: 33.7 G/DL (ref 32–36)
MCV RBC AUTO: 97 FL (ref 80–100)
NRBC BLD-RTO: 0 /100 WBCS (ref 0–0)
PLATELET # BLD AUTO: 133 X10*3/UL (ref 150–450)
POTASSIUM SERPL-SCNC: 4.4 MMOL/L (ref 3.5–5.3)
RBC # BLD AUTO: 3.74 X10*6/UL (ref 4.5–5.9)
S PNEUM AG UR QL: NEGATIVE
SODIUM SERPL-SCNC: 139 MMOL/L (ref 136–145)
WBC # BLD AUTO: 10.8 X10*3/UL (ref 4.4–11.3)

## 2025-03-13 PROCEDURE — 2500000005 HC RX 250 GENERAL PHARMACY W/O HCPCS: Performed by: INTERNAL MEDICINE

## 2025-03-13 PROCEDURE — 80048 BASIC METABOLIC PNL TOTAL CA: CPT | Performed by: NURSE PRACTITIONER

## 2025-03-13 PROCEDURE — 2500000002 HC RX 250 W HCPCS SELF ADMINISTERED DRUGS (ALT 637 FOR MEDICARE OP, ALT 636 FOR OP/ED): Performed by: INTERNAL MEDICINE

## 2025-03-13 PROCEDURE — 1200000002 HC GENERAL ROOM WITH TELEMETRY DAILY

## 2025-03-13 PROCEDURE — 99233 SBSQ HOSP IP/OBS HIGH 50: CPT | Performed by: INTERNAL MEDICINE

## 2025-03-13 PROCEDURE — 83735 ASSAY OF MAGNESIUM: CPT | Performed by: NURSE PRACTITIONER

## 2025-03-13 PROCEDURE — 94640 AIRWAY INHALATION TREATMENT: CPT

## 2025-03-13 PROCEDURE — 36415 COLL VENOUS BLD VENIPUNCTURE: CPT | Performed by: NURSE PRACTITIONER

## 2025-03-13 PROCEDURE — 94760 N-INVAS EAR/PLS OXIMETRY 1: CPT

## 2025-03-13 PROCEDURE — 2500000004 HC RX 250 GENERAL PHARMACY W/ HCPCS (ALT 636 FOR OP/ED)

## 2025-03-13 PROCEDURE — 2500000002 HC RX 250 W HCPCS SELF ADMINISTERED DRUGS (ALT 637 FOR MEDICARE OP, ALT 636 FOR OP/ED): Performed by: NURSE PRACTITIONER

## 2025-03-13 PROCEDURE — 96372 THER/PROPH/DIAG INJ SC/IM: CPT | Performed by: NURSE PRACTITIONER

## 2025-03-13 PROCEDURE — 2500000001 HC RX 250 WO HCPCS SELF ADMINISTERED DRUGS (ALT 637 FOR MEDICARE OP): Performed by: NURSE PRACTITIONER

## 2025-03-13 PROCEDURE — 2500000004 HC RX 250 GENERAL PHARMACY W/ HCPCS (ALT 636 FOR OP/ED): Performed by: NURSE PRACTITIONER

## 2025-03-13 PROCEDURE — 85027 COMPLETE CBC AUTOMATED: CPT | Performed by: NURSE PRACTITIONER

## 2025-03-13 RX ORDER — IPRATROPIUM BROMIDE AND ALBUTEROL SULFATE 2.5; .5 MG/3ML; MG/3ML
3 SOLUTION RESPIRATORY (INHALATION)
Status: DISCONTINUED | OUTPATIENT
Start: 2025-03-13 | End: 2025-03-14 | Stop reason: HOSPADM

## 2025-03-13 RX ORDER — SODIUM CHLORIDE, SODIUM LACTATE, POTASSIUM CHLORIDE, CALCIUM CHLORIDE 600; 310; 30; 20 MG/100ML; MG/100ML; MG/100ML; MG/100ML
100 INJECTION, SOLUTION INTRAVENOUS CONTINUOUS
Status: DISCONTINUED | OUTPATIENT
Start: 2025-03-13 | End: 2025-03-13

## 2025-03-13 RX ADMIN — HEPARIN SODIUM 5000 UNITS: 5000 INJECTION, SOLUTION INTRAVENOUS; SUBCUTANEOUS at 06:06

## 2025-03-13 RX ADMIN — IPRATROPIUM BROMIDE AND ALBUTEROL SULFATE 3 ML: 2.5; .5 SOLUTION RESPIRATORY (INHALATION) at 08:18

## 2025-03-13 RX ADMIN — METHYLPREDNISOLONE SODIUM SUCCINATE 40 MG: 40 INJECTION, POWDER, FOR SOLUTION INTRAMUSCULAR; INTRAVENOUS at 08:55

## 2025-03-13 RX ADMIN — IPRATROPIUM BROMIDE AND ALBUTEROL SULFATE 3 ML: 2.5; .5 SOLUTION RESPIRATORY (INHALATION) at 20:21

## 2025-03-13 RX ADMIN — ACETYLCYSTEINE 800 MG: 200 SOLUTION ORAL; RESPIRATORY (INHALATION) at 20:20

## 2025-03-13 RX ADMIN — ACETYLCYSTEINE 800 MG: 200 SOLUTION ORAL; RESPIRATORY (INHALATION) at 08:18

## 2025-03-13 RX ADMIN — HEPARIN SODIUM 5000 UNITS: 5000 INJECTION, SOLUTION INTRAVENOUS; SUBCUTANEOUS at 21:22

## 2025-03-13 RX ADMIN — FORMOTEROL FUMARATE 20 MCG: 20 SOLUTION RESPIRATORY (INHALATION) at 20:21

## 2025-03-13 RX ADMIN — FORMOTEROL FUMARATE 20 MCG: 20 SOLUTION RESPIRATORY (INHALATION) at 08:17

## 2025-03-13 RX ADMIN — Medication 3 L/MIN: at 13:04

## 2025-03-13 RX ADMIN — CEFTRIAXONE SODIUM 1 G: 1 INJECTION, SOLUTION INTRAVENOUS at 17:15

## 2025-03-13 RX ADMIN — IPRATROPIUM BROMIDE AND ALBUTEROL SULFATE 3 ML: 2.5; .5 SOLUTION RESPIRATORY (INHALATION) at 13:04

## 2025-03-13 RX ADMIN — BUDESONIDE 0.5 MG: 0.5 INHALANT RESPIRATORY (INHALATION) at 20:20

## 2025-03-13 RX ADMIN — HEPARIN SODIUM 5000 UNITS: 5000 INJECTION, SOLUTION INTRAVENOUS; SUBCUTANEOUS at 14:23

## 2025-03-13 RX ADMIN — Medication 3 L/MIN: at 20:21

## 2025-03-13 RX ADMIN — AZITHROMYCIN MONOHYDRATE 500 MG: 500 INJECTION, POWDER, LYOPHILIZED, FOR SOLUTION INTRAVENOUS at 12:04

## 2025-03-13 RX ADMIN — SODIUM CHLORIDE, POTASSIUM CHLORIDE, SODIUM LACTATE AND CALCIUM CHLORIDE 100 ML/HR: 600; 310; 30; 20 INJECTION, SOLUTION INTRAVENOUS at 01:28

## 2025-03-13 RX ADMIN — Medication 3 L/MIN: at 08:17

## 2025-03-13 RX ADMIN — BUDESONIDE 0.5 MG: 0.5 INHALANT RESPIRATORY (INHALATION) at 08:18

## 2025-03-13 RX ADMIN — ACETAMINOPHEN 650 MG: 325 TABLET ORAL at 13:13

## 2025-03-13 ASSESSMENT — COGNITIVE AND FUNCTIONAL STATUS - GENERAL
STANDING UP FROM CHAIR USING ARMS: A LITTLE
HELP NEEDED FOR BATHING: A LITTLE
PERSONAL GROOMING: A LITTLE
TOILETING: A LITTLE
MOVING TO AND FROM BED TO CHAIR: A LITTLE
MOBILITY SCORE: 20
DRESSING REGULAR UPPER BODY CLOTHING: A LITTLE
CLIMB 3 TO 5 STEPS WITH RAILING: A LITTLE
WALKING IN HOSPITAL ROOM: A LITTLE
DRESSING REGULAR LOWER BODY CLOTHING: A LITTLE
DAILY ACTIVITIY SCORE: 19

## 2025-03-13 ASSESSMENT — PAIN DESCRIPTION - LOCATION: LOCATION: HEAD

## 2025-03-13 ASSESSMENT — PAIN SCALES - GENERAL
PAINLEVEL_OUTOF10: 0 - NO PAIN
PAINLEVEL_OUTOF10: 3
PAINLEVEL_OUTOF10: 0 - NO PAIN
PAINLEVEL_OUTOF10: 1

## 2025-03-13 ASSESSMENT — ACTIVITIES OF DAILY LIVING (ADL): LACK_OF_TRANSPORTATION: NO

## 2025-03-13 NOTE — ASSESSMENT & PLAN NOTE
-I certify that greater than 2 midnights will be required in the hospital to facilitate this patient's diagnosis and treatment of the presenting problems as documented above.  -Upgrade to inpatient  -Continue azithromycin despite the urine antigens being negative as part of concomitant COPD exacerbation  -Continue on ceftriaxone  -Can be discharged to finish p.o. course of Augmentin, likely by tomorrow  -Incentive spirometer dispensed to the patient, order in, patient instructed on use  -Respiratory therapy on consult, as needed ed Gomez valve device requested  -Continue with supportive care including Robitussin DM  -Continue with Solu-Medrol, changed to daily given his mild delirium episode last night  -Continue frequent reorientation

## 2025-03-13 NOTE — PROGRESS NOTES
Spiritual Care Visit  Spiritual Care Request    Reason for Visit:  Routine Visit: Introduction  Continue Visiting: Yes     Request Received From:       Focus of Care:  Visited With: Patient and family together         Refer to :          Spiritual Care Assessment    Spiritual Assessment:                      Care Provided:  Intended Effects: Aligning care plan with patient's values, Build relationship of care and support, Convey a calming presence, Demonstrate caring and concern, Lessen someone's feelings of isolation, Lessen anxiety, Helping someone feel comforted, Delma affirmation, Establish rapport and connectedness, Meaning-making, Preserve dignity and respect, Promote sense of peace    Sense of Community and or Moravian Affiliation:  Restoration         Addressed Needs/Concerns and/or Alfredo Through:          Outcome:        Advance Directives:         Spiritual Care Annotation    Annotation:  Really nice visit with patient and his wife.  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  I tried to make some conversation with him in [place] and promised to practice more.  His  is coming to visit later today.

## 2025-03-13 NOTE — CARE PLAN
"The patient's goals for the shift include \"to feel better.\"    The clinical goals for the shift include pt will be free from worsenins sob t/o the shift    IV antibiotics continued, patients BP has improved this evening. IV rocephin continued.  "

## 2025-03-13 NOTE — CARE PLAN
The patient's goals for the shift include  sleep    The clinical goals for the shift include remain free of falls  and injury      Problem: Pain - Adult  Goal: Verbalizes/displays adequate comfort level or baseline comfort level  Outcome: Progressing     Problem: Safety - Adult  Goal: Free from fall injury  Outcome: Progressing     Problem: Discharge Planning  Goal: Discharge to home or other facility with appropriate resources  Outcome: Progressing     Problem: Chronic Conditions and Co-morbidities  Goal: Patient's chronic conditions and co-morbidity symptoms are monitored and maintained or improved  Outcome: Progressing     Problem: Nutrition  Goal: Nutrient intake appropriate for maintaining nutritional needs  Outcome: Progressing

## 2025-03-13 NOTE — PROGRESS NOTES
03/13/25 1038   Discharge Planning   Living Arrangements Spouse/significant other   Support Systems Spouse/significant other   Assistance Needed none   Type of Residence Private residence   Number of Stairs to Enter Residence 3   Number of Stairs Within Residence 0  (does have steps to basement, but he does not go down there often)   Home or Post Acute Services None   Expected Discharge Disposition Home   Does the patient need discharge transport arranged? No   Financial Resource Strain   How hard is it for you to pay for the very basics like food, housing, medical care, and heating? Not hard   Housing Stability   In the last 12 months, was there a time when you were not able to pay the mortgage or rent on time? N   At any time in the past 12 months, were you homeless or living in a shelter (including now)? N   Transportation Needs   In the past 12 months, has lack of transportation kept you from medical appointments or from getting medications? no   In the past 12 months, has lack of transportation kept you from meetings, work, or from getting things needed for daily living? No   Intensity of Service   Intensity of Service 0-30 min     Spoke to patient and wife at bedside to explain my role in discharge planning. Patient states he lives at home with his wife. He uses a cane with ambulation. He has an appt on 3/27/25 with a new PCP Viktor Cagle. Patient currently on O2 at this time and states he does have home O2 set up stating her wears it more at night and doesn't always need it during the day, but does have a portable machine as well if needed. He verified he knows how to order supplies when needed. Patient feels he effectively manages his health at home and plans to return home when medically ready.

## 2025-03-13 NOTE — PROGRESS NOTES
Sindhu Salazar is a 79 y.o. male on day 0 of admission presenting with Pneumonia, unspecified organism.    Subjective   Patient's wife was at the bedside, his daughter, Savanna, who works as a nurse practitioner was on speaker phone throughout the encounter today; the patient himself said that he was able to expectorate a little more and mobilize a little more of his sputum and feel like his cough was getting a little better his respiratory effort was a little better and that he did not have any major complaints.  He did feel some chills over the night, but denied having any fever, he was able to tolerate eating.  His daughter Savanna said she was there visiting until 11 PM last night and that he was getting a little more confused about certain details, he mistook one of the respiratory therapist for being his son-in-law on multiple occasions, although he was able to be reoriented.  He was still able to feel improved despite requiring oxygen, he had not been given his incentive spirometer, writer went to get one for him and instructed him on its use.    Objective     Physical Exam  Constitutional: Well developed, awake/alert/oriented x3, no distress, alert and cooperative   ENMT: mucous membranes moist   Head/Neck: Neck supple   Respiratory/Thorax: Patent airways, CTAB, normal breath sounds with good chest expansion, no wheezing, no rales, no rhonchi thorax symmetric, on 3lpm nasal cannula   Cardiovascular: Regular, rate and rhythm, no murmurs, 2+ equal pulses of the extremities, normal S 1and S 2   Gastrointestinal: Nondistended, soft, non-tender, no rebound tenderness or guarding, no masses palpable, no organomegaly, +BS, no bruits   Musculoskeletal: ROM intact, no joint swelling, normal strength   Extremities: normal extremities, no cyanosis edema, contusions or wounds, no clubbing   Neurological: alert and oriented x3, intact senses, motor, response and reflexes, normal strength     Last Recorded Vitals  Blood  "pressure 104/60, pulse 83, temperature 37 °C (98.6 °F), resp. rate 17, height 1.676 m (5' 6\"), weight 79.4 kg (175 lb), SpO2 94%.  Intake/Output last 3 Shifts:  I/O last 3 completed shifts:  In: 1783.3 (22.5 mL/kg) [I.V.:375 (4.7 mL/kg); IV Piggyback:1408.3]  Out: 600 (7.6 mL/kg) [Urine:600 (0.2 mL/kg/hr)]  Weight: 79.4 kg     Relevant Results  Scheduled medications  acetylcysteine, 4 mL, nebulization, BID  azithromycin, 500 mg, intravenous, q24h  budesonide, 0.5 mg, nebulization, BID  cefTRIAXone, 1 g, intravenous, q24h  formoterol, 20 mcg, nebulization, BID  heparin (porcine), 5,000 Units, subcutaneous, q8h PEDRITO  ipratropium-albuteroL, 3 mL, nebulization, q6h  [START ON 3/14/2025] methylPREDNISolone sodium succinate (PF), 40 mg, intravenous, q24h      Continuous medications     PRN medications  PRN medications: acetaminophen, albuterol, guaiFENesin, melatonin, ondansetron **OR** ondansetron, oxygen, polyethylene glycol    Results for orders placed or performed during the hospital encounter of 03/12/25 (from the past 24 hours)   MRSA Surveillance for Vancomycin De-escalation, PCR    Specimen: Anterior Nares; Swab   Result Value Ref Range    MRSA PCR Not Detected Not Detected   Legionella Antigen, Urine    Specimen: Clean Catch/Voided; Urine   Result Value Ref Range    L. pneumophila Urine Ag Negative Negative   Streptococcus pneumoniae Antigen, Urine    Specimen: Clean Catch/Voided; Urine   Result Value Ref Range    Streptococcus pneumoniae Ag, Urine Negative Negative   Urinalysis with Reflex Culture and Microscopic   Result Value Ref Range    Color, Urine Light-Yellow Light-Yellow, Yellow, Dark-Yellow    Appearance, Urine Clear Clear    Specific Gravity, Urine 1.029 1.005 - 1.035    pH, Urine 6.5 5.0, 5.5, 6.0, 6.5, 7.0, 7.5, 8.0    Protein, Urine NEGATIVE NEGATIVE, 10 (TRACE), 20 (TRACE) mg/dL    Glucose, Urine 100 (1+) (A) Normal mg/dL    Blood, Urine NEGATIVE NEGATIVE mg/dL    Ketones, Urine NEGATIVE NEGATIVE " mg/dL    Bilirubin, Urine NEGATIVE NEGATIVE mg/dL    Urobilinogen, Urine Normal Normal mg/dL    Nitrite, Urine NEGATIVE NEGATIVE    Leukocyte Esterase, Urine NEGATIVE NEGATIVE   Extra Urine Gray Tube   Result Value Ref Range    Extra Tube Hold for add-ons.    CBC   Result Value Ref Range    WBC 10.8 4.4 - 11.3 x10*3/uL    nRBC 0.0 0.0 - 0.0 /100 WBCs    RBC 3.74 (L) 4.50 - 5.90 x10*6/uL    Hemoglobin 12.2 (L) 13.5 - 17.5 g/dL    Hematocrit 36.2 (L) 41.0 - 52.0 %    MCV 97 80 - 100 fL    MCH 32.6 26.0 - 34.0 pg    MCHC 33.7 32.0 - 36.0 g/dL    RDW 12.8 11.5 - 14.5 %    Platelets 133 (L) 150 - 450 x10*3/uL   Basic metabolic panel   Result Value Ref Range    Glucose 120 (H) 74 - 99 mg/dL    Sodium 139 136 - 145 mmol/L    Potassium 4.4 3.5 - 5.3 mmol/L    Chloride 107 98 - 107 mmol/L    Bicarbonate 26 21 - 32 mmol/L    Anion Gap 10 10 - 20 mmol/L    Urea Nitrogen 18 6 - 23 mg/dL    Creatinine 0.92 0.50 - 1.30 mg/dL    eGFR 85 >60 mL/min/1.73m*2    Calcium 8.3 (L) 8.6 - 10.3 mg/dL   Magnesium   Result Value Ref Range    Magnesium 2.09 1.60 - 2.40 mg/dL     CT angio chest for pulmonary embolism    Result Date: 3/12/2025  Interpreted By:  Marbin Sewell, STUDY: CT ANGIO CHEST FOR PULMONARY EMBOLISM; 3/12/2025 2:49 pm   INDICATION: Signs/Symptoms:Elevated D-dimer, tachycardia, shortness of breath, low oxygen saturation.   COMPARISON: None.   ACCESSION NUMBER(S): [id number]   ORDERING CLINICIAN: THIAGO HIGUERA   TECHNIQUE: 75 ml of non-ionic iodinated contrast was injected intravenously.   CT angiography (non-coronary) of the chest was performed with Intravenous contrast material along with 3D (maximum intensity projection - MIP) image post processing.   One or more of the following dose reduction techniques were used: Automated exposure control Adjustment of the mA and/or kV according to patient size, and/or use of iterative reconstruction technique.   FINDINGS: There is no evidence for aortic dissection or pericardial  effusion. There is no evidence for pulmonary embolism.   There is extensive emphysematous change of the pulmonary parenchyma bilaterally. This is most severe within the upper lobes. There is alveolar consolidation within the posterior segments of both right lower lobe and left lower lobe. There is bandlike atelectasis within the right middle lobe and lingula. No effusions are identified.   Chest Wall: No chest wall abnormalities are identified.   Upper Abdominal Findings: No pathologic findings are identified involving the visualized portions of the upper abdomen.   Skeletal System: There is a 5.3 cm hiatal hernia. There is a left upper quadrant accessory splenule.       1. No evidence for pulmonary embolism. 2. Bilateral lower lobe areas of consolidation are identified consistent with pneumonia. Follow-up to assure clearing is recommended. 3. Right middle lobe and lingular subsegmental atelectasis is present. 4. Severe emphysematous changes of the pulmonary parenchyma is present.   MACRO: none   Signed by: Marbin Sewell 3/12/2025 3:00 PM Dictation workstation:   [id number]    XR chest 1 view    Result Date: 3/12/2025  Interpreted By:  Marbin Sewell, STUDY: XR CHEST 1 VIEW; 3/12/2025 10:40 am   INDICATION: CLINICAL INFORMATION: Signs/Symptoms:Shortness of breath, hypoxia.   COMPARISON: CT chest 12/23/2024   ACCESSION NUMBER(S): [id number]   ORDERING CLINICIAN: BENTIO BRYANT   TECHNIQUE: Portable chest one view.   FINDINGS: The cardiac size is indeterminate in view of the AP projection. Severe emphysematous change of the pulmonary parenchyma is present bilaterally, most marked within the right upper lobe. Patchy bibasilar infiltrate is present. Infiltrate appears new compared to the prior CT. No effusions are identified. The aorta is tortuous.       Patchy bibasilar infiltrate is present. Severe COPD.   MACRO: none   Signed by: Marbin Sewell 3/12/2025 11:04 AM Dictation workstation:   [id number]    ECG 12  lead    Result Date: 3/12/2025  Sinus tachycardia with occasional Premature ventricular complexes Otherwise normal ECG No previous ECGs available       Assessment/Plan   This is a very pleasant 79-year-old gentleman with known history of prostate cancer, basal cell carcinoma of the face, COPD on 3 L/min nocturnal oxygen, former tobacco abuse with 75-pack-year history who presented with multiple days of cough and shortness of breath that was worse over several days time period, he did feel like he was having some minor fevers and chills today, he had a decreased appetite as well, he did not have any chest pain or abdominal pain, his oxygen saturations were low on arrival to the emergency department, his CTA of his chest was with PE protocol, it was negative for a embolism, although showed bilateral lower lobe pneumonia, unfortunately for an unclear reason he was initially recommended treatment with vancomycin, Zosyn and azithromycin; there are no risk factors for this type of treatment regimen, he would be better served for ceftriaxone and azithromycin, will plan on doing 500 mg course daily for 3 days of azithromycin, treating a concomitant COPD exacerbation, and changing antibiotic to cover for CAP, will upgrade to inpatient today, continue supportive care, aggressive pulmonary/bronchial hygiene, adding PT/OT, family updated at bedside as mentioned above.  Assessment & Plan  Pneumonia, unspecified organism  -I certify that greater than 2 midnights will be required in the hospital to facilitate this patient's diagnosis and treatment of the presenting problems as documented above.  -Upgrade to inpatient  -Continue azithromycin despite the urine antigens being negative as part of concomitant COPD exacerbation  -Continue on ceftriaxone  -Can be discharged to finish p.o. course of Augmentin, likely by tomorrow  -Incentive spirometer dispensed to the patient, order in, patient instructed on use  -Respiratory therapy on  consult, as needed DuAldo, flushanteler valve device requested  -Continue with supportive care including Robitussin DM  -Continue with Solu-Medrol, changed to daily given his mild delirium episode last night  -Continue frequent reorientation  COPD exacerbation (Multi)  -See above  Acute on chronic respiratory failure with hypoxia (Multi)  -Oxygen supplementation, wean as tolerated to maintain SpO2 greater than 90%    Regular diet  Subcu heparin for VTE PPx  Full code           I spent 35 minutes in the professional and overall care of this patient.    Brian Barker MD

## 2025-03-14 ENCOUNTER — HOME HEALTH ADMISSION (OUTPATIENT)
Dept: HOME HEALTH SERVICES | Facility: HOME HEALTH | Age: 80
End: 2025-03-14
Payer: MEDICARE

## 2025-03-14 ENCOUNTER — PHARMACY VISIT (OUTPATIENT)
Dept: PHARMACY | Facility: CLINIC | Age: 80
End: 2025-03-14
Payer: COMMERCIAL

## 2025-03-14 ENCOUNTER — TELEPHONE (OUTPATIENT)
Facility: CLINIC | Age: 80
End: 2025-03-14
Payer: MEDICARE

## 2025-03-14 ENCOUNTER — DOCUMENTATION (OUTPATIENT)
Dept: HOME HEALTH SERVICES | Facility: HOME HEALTH | Age: 80
End: 2025-03-14
Payer: MEDICARE

## 2025-03-14 VITALS
SYSTOLIC BLOOD PRESSURE: 100 MMHG | BODY MASS INDEX: 28.12 KG/M2 | WEIGHT: 175 LBS | RESPIRATION RATE: 16 BRPM | HEIGHT: 66 IN | DIASTOLIC BLOOD PRESSURE: 61 MMHG | OXYGEN SATURATION: 93 % | HEART RATE: 94 BPM | TEMPERATURE: 98.6 F

## 2025-03-14 PROBLEM — J18.9 PNEUMONIA, UNSPECIFIED ORGANISM: Status: RESOLVED | Noted: 2025-03-12 | Resolved: 2025-03-14

## 2025-03-14 LAB
ANION GAP SERPL CALC-SCNC: 10 MMOL/L (ref 10–20)
BUN SERPL-MCNC: 19 MG/DL (ref 6–23)
CALCIUM SERPL-MCNC: 8.8 MG/DL (ref 8.6–10.3)
CHLORIDE SERPL-SCNC: 106 MMOL/L (ref 98–107)
CO2 SERPL-SCNC: 26 MMOL/L (ref 21–32)
CREAT SERPL-MCNC: 0.73 MG/DL (ref 0.5–1.3)
EGFRCR SERPLBLD CKD-EPI 2021: >90 ML/MIN/1.73M*2
ERYTHROCYTE [DISTWIDTH] IN BLOOD BY AUTOMATED COUNT: 12.9 % (ref 11.5–14.5)
GLUCOSE SERPL-MCNC: 95 MG/DL (ref 74–99)
HCT VFR BLD AUTO: 36 % (ref 41–52)
HGB BLD-MCNC: 12.1 G/DL (ref 13.5–17.5)
MAGNESIUM SERPL-MCNC: 2.09 MG/DL (ref 1.6–2.4)
MCH RBC QN AUTO: 32.4 PG (ref 26–34)
MCHC RBC AUTO-ENTMCNC: 33.6 G/DL (ref 32–36)
MCV RBC AUTO: 96 FL (ref 80–100)
NRBC BLD-RTO: 0 /100 WBCS (ref 0–0)
PLATELET # BLD AUTO: 154 X10*3/UL (ref 150–450)
POTASSIUM SERPL-SCNC: 4.1 MMOL/L (ref 3.5–5.3)
RBC # BLD AUTO: 3.74 X10*6/UL (ref 4.5–5.9)
SODIUM SERPL-SCNC: 138 MMOL/L (ref 136–145)
WBC # BLD AUTO: 10.6 X10*3/UL (ref 4.4–11.3)

## 2025-03-14 PROCEDURE — 94640 AIRWAY INHALATION TREATMENT: CPT

## 2025-03-14 PROCEDURE — RXMED WILLOW AMBULATORY MEDICATION CHARGE

## 2025-03-14 PROCEDURE — 36415 COLL VENOUS BLD VENIPUNCTURE: CPT | Performed by: INTERNAL MEDICINE

## 2025-03-14 PROCEDURE — 2500000002 HC RX 250 W HCPCS SELF ADMINISTERED DRUGS (ALT 637 FOR MEDICARE OP, ALT 636 FOR OP/ED): Performed by: INTERNAL MEDICINE

## 2025-03-14 PROCEDURE — 80048 BASIC METABOLIC PNL TOTAL CA: CPT | Performed by: INTERNAL MEDICINE

## 2025-03-14 PROCEDURE — 83735 ASSAY OF MAGNESIUM: CPT | Performed by: INTERNAL MEDICINE

## 2025-03-14 PROCEDURE — 97161 PT EVAL LOW COMPLEX 20 MIN: CPT | Mod: GP

## 2025-03-14 PROCEDURE — 99239 HOSP IP/OBS DSCHRG MGMT >30: CPT | Performed by: INTERNAL MEDICINE

## 2025-03-14 PROCEDURE — 2500000005 HC RX 250 GENERAL PHARMACY W/O HCPCS: Performed by: INTERNAL MEDICINE

## 2025-03-14 PROCEDURE — 97116 GAIT TRAINING THERAPY: CPT | Mod: GP

## 2025-03-14 PROCEDURE — 2500000001 HC RX 250 WO HCPCS SELF ADMINISTERED DRUGS (ALT 637 FOR MEDICARE OP)

## 2025-03-14 PROCEDURE — 2500000004 HC RX 250 GENERAL PHARMACY W/ HCPCS (ALT 636 FOR OP/ED): Performed by: NURSE PRACTITIONER

## 2025-03-14 PROCEDURE — 97530 THERAPEUTIC ACTIVITIES: CPT | Mod: GP

## 2025-03-14 PROCEDURE — 2500000004 HC RX 250 GENERAL PHARMACY W/ HCPCS (ALT 636 FOR OP/ED): Mod: JZ | Performed by: INTERNAL MEDICINE

## 2025-03-14 PROCEDURE — 85027 COMPLETE CBC AUTOMATED: CPT | Performed by: INTERNAL MEDICINE

## 2025-03-14 RX ORDER — PREDNISONE 10 MG/1
40 TABLET ORAL DAILY
Qty: 12 TABLET | Refills: 0 | Status: SHIPPED | OUTPATIENT
Start: 2025-03-14 | End: 2025-03-20 | Stop reason: ALTCHOICE

## 2025-03-14 RX ORDER — GUAIFENESIN 600 MG/1
600 TABLET, EXTENDED RELEASE ORAL 2 TIMES DAILY
Qty: 10 TABLET | Refills: 0 | Status: SHIPPED | OUTPATIENT
Start: 2025-03-14 | End: 2025-03-19

## 2025-03-14 RX ORDER — IPRATROPIUM BROMIDE AND ALBUTEROL SULFATE 2.5; .5 MG/3ML; MG/3ML
3 SOLUTION RESPIRATORY (INHALATION)
Qty: 300 ML | Refills: 0 | Status: SHIPPED | OUTPATIENT
Start: 2025-03-14 | End: 2025-03-14

## 2025-03-14 RX ORDER — IPRATROPIUM BROMIDE AND ALBUTEROL SULFATE 2.5; .5 MG/3ML; MG/3ML
3 SOLUTION RESPIRATORY (INHALATION)
Qty: 300 ML | Refills: 0 | Status: SHIPPED | OUTPATIENT
Start: 2025-03-14

## 2025-03-14 RX ORDER — AMOXICILLIN AND CLAVULANATE POTASSIUM 875; 125 MG/1; MG/1
875 TABLET, FILM COATED ORAL 2 TIMES DAILY
Qty: 10 TABLET | Refills: 0 | Status: SHIPPED | OUTPATIENT
Start: 2025-03-14 | End: 2025-03-20 | Stop reason: ALTCHOICE

## 2025-03-14 RX ADMIN — BENZOCAINE AND MENTHOL 1 LOZENGE: 15; 3.6 LOZENGE ORAL at 06:56

## 2025-03-14 RX ADMIN — FORMOTEROL FUMARATE 20 MCG: 20 SOLUTION RESPIRATORY (INHALATION) at 08:33

## 2025-03-14 RX ADMIN — ACETYLCYSTEINE 800 MG: 200 SOLUTION ORAL; RESPIRATORY (INHALATION) at 08:33

## 2025-03-14 RX ADMIN — Medication 3 L/MIN: at 13:34

## 2025-03-14 RX ADMIN — HEPARIN SODIUM 5000 UNITS: 5000 INJECTION, SOLUTION INTRAVENOUS; SUBCUTANEOUS at 06:03

## 2025-03-14 RX ADMIN — IPRATROPIUM BROMIDE AND ALBUTEROL SULFATE 3 ML: 2.5; .5 SOLUTION RESPIRATORY (INHALATION) at 08:33

## 2025-03-14 RX ADMIN — BUDESONIDE 0.5 MG: 0.5 INHALANT RESPIRATORY (INHALATION) at 08:33

## 2025-03-14 RX ADMIN — METHYLPREDNISOLONE SODIUM SUCCINATE 40 MG: 40 INJECTION, POWDER, FOR SOLUTION INTRAMUSCULAR; INTRAVENOUS at 08:35

## 2025-03-14 RX ADMIN — IPRATROPIUM BROMIDE AND ALBUTEROL SULFATE 3 ML: 2.5; .5 SOLUTION RESPIRATORY (INHALATION) at 13:33

## 2025-03-14 RX ADMIN — Medication 3 L/MIN: at 08:36

## 2025-03-14 ASSESSMENT — COGNITIVE AND FUNCTIONAL STATUS - GENERAL
MOVING TO AND FROM BED TO CHAIR: A LITTLE
STANDING UP FROM CHAIR USING ARMS: A LITTLE
WALKING IN HOSPITAL ROOM: A LITTLE
MOBILITY SCORE: 19
MOBILITY SCORE: 20
STANDING UP FROM CHAIR USING ARMS: A LITTLE
DRESSING REGULAR LOWER BODY CLOTHING: A LITTLE
WALKING IN HOSPITAL ROOM: A LITTLE
TOILETING: A LITTLE
HELP NEEDED FOR BATHING: A LITTLE
CLIMB 3 TO 5 STEPS WITH RAILING: A LOT
CLIMB 3 TO 5 STEPS WITH RAILING: A LITTLE
PERSONAL GROOMING: A LITTLE
DAILY ACTIVITIY SCORE: 19
MOVING TO AND FROM BED TO CHAIR: A LITTLE
DRESSING REGULAR UPPER BODY CLOTHING: A LITTLE

## 2025-03-14 ASSESSMENT — PAIN SCALES - GENERAL: PAINLEVEL_OUTOF10: 0 - NO PAIN

## 2025-03-14 ASSESSMENT — PAIN - FUNCTIONAL ASSESSMENT: PAIN_FUNCTIONAL_ASSESSMENT: 0-10

## 2025-03-14 NOTE — HH CARE COORDINATION
Home Care received a Referral for Nursing, Physical Therapy, and Occupational Therapy. We have processed the referral for a Start of Care on 03/15/2025.     If you have any questions or concerns, please feel free to contact us at [phone]. Follow the prompts, enter your five digit zip code, and you will be directed to your care team on [facility].

## 2025-03-14 NOTE — CARE PLAN
The patient's goals for the shift include      The clinical goals for the shift include Pt will remain hemodynamically stable all shift    Problem: Pain - Adult  Goal: Verbalizes/displays adequate comfort level or baseline comfort level  Outcome: Met     Problem: Safety - Adult  Goal: Free from fall injury  Outcome: Met     Problem: Discharge Planning  Goal: Discharge to home or other facility with appropriate resources  Outcome: Met     Problem: Chronic Conditions and Co-morbidities  Goal: Patient's chronic conditions and co-morbidity symptoms are monitored and maintained or improved  Outcome: Met     Problem: Nutrition  Goal: Nutrient intake appropriate for maintaining nutritional needs  Outcome: Met     Problem: Skin  Goal: Decreased wound size/increased tissue granulation at next dressing change  Outcome: Met  Goal: Participates in plan/prevention/treatment measures  Outcome: Met  Goal: Prevent/manage excess moisture  Outcome: Met  Goal: Prevent/minimize sheer/friction injuries  Outcome: Met  Goal: Promote/optimize nutrition  Outcome: Met  Goal: Promote skin healing  Outcome: Met     Problem: Fall/Injury  Goal: Not fall by end of shift  Outcome: Met  Goal: Be free from injury by end of the shift  Outcome: Met  Goal: Verbalize understanding of personal risk factors for fall in the hospital  Outcome: Met  Goal: Verbalize understanding of risk factor reduction measures to prevent injury from fall in the home  Outcome: Met  Goal: Use assistive devices by end of the shift  Outcome: Met  Goal: Pace activities to prevent fatigue by end of the shift  Outcome: Met     Problem: Respiratory  Goal: Clear secretions with interventions this shift  Outcome: Met  Goal: Minimize anxiety/maximize coping throughout shift  Outcome: Met  Goal: Minimal/no exertional discomfort or dyspnea this shift  Outcome: Met  Goal: No signs of respiratory distress (eg. Use of accessory muscles. Peds grunting)  Outcome: Met  Goal: Patent airway  maintained this shift  Outcome: Met  Goal: Verbalize decreased shortness of breath this shift  Outcome: Met  Goal: Wean oxygen to maintain O2 saturation per order/standard this shift  Outcome: Met  Goal: Increase self care and/or family involvement in next 24 hours  Outcome: Met

## 2025-03-14 NOTE — PROGRESS NOTES
03/14/25 1323   Discharge Planning   Home or Post Acute Services In home services   Type of Home Care Services Home OT;Home PT;Home nursing visits   Expected Discharge Disposition Home H   Does the patient need discharge transport arranged? No     Met with patient and spouse at the bedside, and they confirm he will return home with the [facility] and [facility]. His primary physician is Dr. Cagle, but the first appointment is scheduled for 03/17. He uses oxygen , but is unsure the name of the supplier but knows where to find this information.  Notified the intake nurse for [facility].

## 2025-03-14 NOTE — TELEPHONE ENCOUNTER
Sindhu called in to let you know that he has been in the hospital for pneumonia and wanted you to review the records prior to his visit in June. Thanks

## 2025-03-14 NOTE — PROGRESS NOTES
Physical Therapy    Physical Therapy Evaluation    Patient Name: Sindhu Salazar  MRN: [id number]  Today's Date: 3/14/2025   Time Calculation  Start Time: 0921  Stop Time: 0951  Time Calculation (min): 30 min  3122/3122-A    Assessment/Plan   PT Assessment: Pt demonstrates impairments listed below.  Pt appears below baseline level of function and based on current level of function, pt would benefit from continued skilled therapy while in the hospital to ensure safety, decrease risk of falls, and regain strength/mobility back to baseline.  Once stable enough for discharge, pt would benefit from low intensity therapy.     PT Assessment Results: Decreased strength, Decreased range of motion, Decreased endurance, Impaired balance, Decreased mobility  Rehab Prognosis: Good  Evaluation/Treatment Tolerance: Patient tolerated treatment well  Medical Staff Made Aware: Yes  End of Session Communication: Bedside nurse  End of Session Patient Position: Up in chair, Alarm on  IP OR SWING BED PT PLAN  Inpatient or Swing Bed: Inpatient  PT Plan  Treatment/Interventions: Bed mobility, Transfer training, Gait training, Stair training, Balance training, Neuromuscular re-education, Strengthening, Endurance training, Range of motion, Therapeutic exercise, Therapeutic activity, Home exercise program  PT Plan: Ongoing PT  PT Frequency: 3 times per week  PT Discharge Recommendations: Low intensity level of continued care  Equipment Recommended upon Discharge:  (SPC PRN)  PT Recommended Transfer Status: Stand by assist, Contact guard  PT - OK to Discharge: Yes - To next level of care when cleared by medical team    Subjective     Current Problem:  1. COPD exacerbation (Multi)        2. Pneumonia due to infectious organism, unspecified laterality, unspecified part of lung        3. Acute on chronic respiratory failure with hypoxia (Multi)            Past Medical History:  Patient Active Problem List   Diagnosis    Pneumonia due to infectious  organism    COPD exacerbation (Multi)    Acute on chronic respiratory failure with hypoxia (Multi)    Pneumonia, unspecified organism       General Visit Information:  Per EMR: pt presents to [facility] ER with chief complaints of cough and shortness of breath. Patient states he has chronic cough but worse over the past several days. Patient states he felt more short of breath today after waking up. He denies any chest pain, abd pain, fever, nausea or vomiting, endorses chills today. He was found to be requiring 3 lpm nasal cannula on arrival to ED with oxygen sats in low 80's. CTA negative of acute PE, however, indicating bilateral lower lobe pneumonia. He was started on IV antibiotics. He was also tachycardic in ED, received 2 L of IV fluids and was fluid responsive. He will be admitted for acute on chronic respiratory failure and pneumonia.     On arrival, pt supine in bed.  Pt in no apparent distress and agreeable to therapy.    General  Reason for Referral: impaired mobility  Referred By: Tex Stevens  Family/Caregiver Present: Yes  Caregiver Feedback: wife present  Prior to Session Communication: Bedside nurse  Patient Position Received: Bed, 3 rail up, Alarm on    Home Living/PLOF:  Pt lives in house with wife with 2 LEÓN.  1 floor set up.  Laundry is basement but pt seldom goes into basement.  12 steps down with rail.  Has WIS with Gbs.  IND with ADLs.  IND with mobility inside home, uses cane outside of home as needed, owns FWW.  Pt drives.  Shared IADLs.  Wife is home with pt.  Denies any falls.     Precautions:  Precautions  Medical Precautions: Fall precautions, Oxygen therapy device and L/min (3LNC)    Vital Signs:  Vital Signs  SpO2:  (92% on 3LNC at rest; 84-85% on 3LNC with increased ambulation, would not improved from 85% therefore FiO2 increaed to 4LNC for mabulating back to room; improved to 93% on 4LNC at rest; returned pt to 3LNC and SpO2 stable at 93%)  Objective      Pain:  Pain Assessment  Pain Assessment: 0-10  0-10 (Numeric) Pain Score: 0 - No pain    Cognition:  Cognition  Overall Cognitive Status: Within Functional Limits  Orientation Level: Oriented X4    General Assessments:      Activity Tolerance  Endurance: Tolerates 10 - 20 min exercise with multiple rests  Sensation  Sensation Comment: pt denies any numbness/tingling    Static Sitting Balance  Static Sitting-Comment/Number of Minutes: good  Dynamic Sitting Balance  Dynamic Sitting-Comments: good  Static Standing Balance  Static Standing-Comment/Number of Minutes: good  Dynamic Standing Balance  Dynamic Standing-Comments: fair plus    Extremity/Trunk Assessments:  BLE strength: grossly WFL    Functional Mobility:  Bed mobility  Supine to sit: SUP    Transfers  Sit to stand: x2 trials with SBA  Stand to sit: x2 trials with SBA    Ambulation/Stairs  Pt ambulated 20 ft x2 with SBA without device; grossly steady without LOB    Pt ambulated 75 ft x1 with SBA-CGA without device; pt required several standing rest breaks 2/2 decreased SpO2; 1 mild stumble during ambulation trial, but no true LOB    Outcome Measures:  [facility] Basic Mobility  Turning from your back to your side while in a flat bed without using bedrails: None  Moving from lying on your back to sitting on the side of a flat bed without using bedrails: None  Moving to and from bed to chair (including a wheelchair): A little  Standing up from a chair using your arms (e.g. wheelchair or bedside chair): A little  To walk in hospital room: A little  Climbing 3-5 steps with railing: A little  Basic Mobility - Total Score: 20    Goals:  Encounter Problems       Encounter Problems (Active)       PT Problem       Pt will be able to perform all bed mobility tasks with IND.  (Progressing)       Start:  03/14/25    Expected End:  03/28/25            Pt will perform all transfers with IND with proper safety mechanics.   (Progressing)       Start:  03/14/25    Expected End:   03/28/25            Pt will ambulate 120 ft with Mod I using SPC PRN for improved functional independence.  (Progressing)       Start:  03/14/25    Expected End:  03/28/25            Pt will be able to negotiate 2 steps with 1 HR with Mod I.  (Progressing)       Start:  03/14/25    Expected End:  03/28/25            Pt will be able to ambulate at least 120 ft with < or equal to 1 rest break while maintaining SpO2 > 90%.  (Progressing)       Start:  03/14/25    Expected End:  03/28/25                 Education Documentation  Body Mechanics, taught by Danita Nino, PT at 3/14/2025 10:33 AM.  Learner: Patient  Readiness: Acceptance  Method: Explanation  Response: Verbalizes Understanding    Home Exercise Program, taught by Danita Nino, PT at 3/14/2025 10:33 AM.  Learner: Patient  Readiness: Acceptance  Method: Explanation  Response: Verbalizes Understanding    Mobility Training, taught by Danita Nino, PT at 3/14/2025 10:33 AM.  Learner: Patient  Readiness: Acceptance  Method: Explanation  Response: Verbalizes Understanding    Education Comments  No comments found.

## 2025-03-14 NOTE — CARE PLAN
Problem: Pain - Adult  Goal: Verbalizes/displays adequate comfort level or baseline comfort level  Outcome: Progressing     Problem: Safety - Adult  Goal: Free from fall injury  Outcome: Progressing     Problem: Discharge Planning  Goal: Discharge to home or other facility with appropriate resources  Outcome: Progressing     Problem: Chronic Conditions and Co-morbidities  Goal: Patient's chronic conditions and co-morbidity symptoms are monitored and maintained or improved  Outcome: Progressing     Problem: Nutrition  Goal: Nutrient intake appropriate for maintaining nutritional needs  Outcome: Progressing     Problem: Skin  Goal: Decreased wound size/increased tissue granulation at next dressing change  Outcome: Progressing  Goal: Participates in plan/prevention/treatment measures  Outcome: Progressing  Goal: Prevent/manage excess moisture  Outcome: Progressing  Goal: Prevent/minimize sheer/friction injuries  Outcome: Progressing  Goal: Promote/optimize nutrition  Outcome: Progressing  Goal: Promote skin healing  Outcome: Progressing     Problem: Fall/Injury  Goal: Not fall by end of shift  Outcome: Progressing  Goal: Be free from injury by end of the shift  Outcome: Progressing  Goal: Verbalize understanding of personal risk factors for fall in the hospital  Outcome: Progressing  Goal: Verbalize understanding of risk factor reduction measures to prevent injury from fall in the home  Outcome: Progressing  Goal: Use assistive devices by end of the shift  Outcome: Progressing  Goal: Pace activities to prevent fatigue by end of the shift  Outcome: Progressing     Problem: Respiratory  Goal: Clear secretions with interventions this shift  Outcome: Progressing  Goal: Minimize anxiety/maximize coping throughout shift  Outcome: Progressing  Goal: Minimal/no exertional discomfort or dyspnea this shift  Outcome: Progressing  Goal: No signs of respiratory distress (eg. Use of accessory muscles. Peds grunting)  Outcome:  Progressing  Goal: Patent airway maintained this shift  Outcome: Progressing  Goal: Verbalize decreased shortness of breath this shift  Outcome: Progressing  Goal: Wean oxygen to maintain O2 saturation per order/standard this shift  Outcome: Progressing  Goal: Increase self care and/or family involvement in next 24 hours  Outcome: Progressing   The patient's goals for the shift include      The clinical goals for the shift include Pt will remain free from fall/injury

## 2025-03-14 NOTE — DISCHARGE SUMMARY
Discharge Diagnosis  Pneumonia, unspecified organism    Issues Requiring Follow-Up  Establish with PCP  Follow up with Dr. Delgado/pulmonology as scheduled    Discharge Meds     Medication List      START taking these medications     amoxicillin-pot clavulanate 875-125 mg tablet; Commonly known as:   Augmentin; Take 1 tablet (875 mg) by mouth 2 times a day for 5 days.   guaiFENesin 600 mg 12 hr tablet; Commonly known as: Mucinex; Take 1   tablet (600 mg) by mouth 2 times a day for 10 doses. Do not crush, chew,   or split.   ipratropium-albuteroL 0.5-2.5 mg/3 mL nebulizer solution; Commonly known   as: Duo-Neb; Take 3 mL by nebulization 3 times a day.   oxygen gas therapy; Commonly known as: O2; Inhale 1 each once every 24   hours.   predniSONE 10 mg tablet; Commonly known as: Deltasone; Take 4 tablets   (40 mg) by mouth once daily.     CONTINUE taking these medications     acetylcysteine 200 mg/mL (20 %) nebulizer solution; Commonly known as:   Mucomyst; Take 4 mL (800 mg) by nebulization 2 times a day.   mometasone-formoterol 200-5 mcg/actuation inhaler; Commonly known as:   Dulera 200; Inhale 2 puffs 2 times a day.   Ventolin HFA 90 mcg/actuation inhaler; Generic drug: albuterol     STOP taking these medications     tiotropium 18 mcg inhalation capsule; Commonly known as: Spiriva       Test Results Pending At Discharge  Pending Labs       No current pending labs.            Hospital Course   History Of Present Illness  Sindhu Salazar is a 79 y.o. male with history of Prostate Cancer, Basal cell carcinoma of the face, and COPD on 3 lpm nasal cannula at night,  who is a former smoker (~75 pack years) presents to [facility] ER with chief complaints of cough and shortness of breath. Patient states he has chronic cough but worse over the past several days. Patient states he felt more short of breath today after waking up. He denies any chest pain, abd pain, fever, nausea or vomiting, endorses chills today.  He was found to be requiring 3 lpm nasal cannula on arrival to ED with oxygen sats in low 80's. CTA negative of acute PE, however, indicating bilateral lower lobe pneumonia. He was started on IV antibiotics. He was also tachycardic in ED, received 2 L of IV fluids and was fluid responsive. He will be admitted for acute on chronic respiratory failure and pneumonia.      Hospital course: Patient was admitted to the hospital with acute on chronic respiratory failure with hypoxia as evidenced by requiring greater than 3 L nasal cannula to maintain SpO2 greater than 90%.  Patient has COPD and is on 3 L nasal cannula at night only but was requiring during the day and discharged on this dose.  Patient was treated with ceftriaxone and azithromycin and felt much better.  His lung exam was very unremarkable.  He did have a superimposed acute exacerbation of COPD and was on Solu-Medrol for this.  On his third day of admission patient was not wheezing.  He was taking full breaths.  He was ambulating in the hallway and doing very well without much shortness of breath.  He did still continue to have a mild productive cough.  He had no wheezing.  Patient felt comfortable going home.  Patient is in between primary care providers and has appointment coming up later this month to establish with PCP.  Patient was referred to healthy at home virtual clinic in addition to having home health care set up for RN PT and OT.  Patient was discharged home with Augmentin and prednisone in stable condition.    Discharge time greater than 35 minutes. Greater than 50% of time spent on counseling patient, coordination of care, medication reconciliation, transmitting medications to pharmacy and clarifying plan of care with nursing staff and case management.      Pertinent Physical Exam At Time of Discharge  Physical Exam  Constitutional:       General: He is not in acute distress.     Appearance: Normal appearance.   HENT:      Head: Normocephalic  and atraumatic.      Mouth/Throat:      Mouth: Mucous membranes are moist.   Eyes:      Extraocular Movements: Extraocular movements intact.      Conjunctiva/sclera: Conjunctivae normal.   Cardiovascular:      Rate and Rhythm: Normal rate and regular rhythm.      Heart sounds: Normal heart sounds.   Pulmonary:      Effort: Pulmonary effort is normal.      Breath sounds: Normal breath sounds. No wheezing, rhonchi or rales.      Comments: Unremarkable exam; speaks in full sentences  Abdominal:      General: Abdomen is flat. Bowel sounds are normal.      Palpations: Abdomen is soft.   Musculoskeletal:         General: No swelling or tenderness. Normal range of motion.      Cervical back: Normal range of motion and neck supple.   Skin:     General: Skin is warm and dry.      Findings: No rash.   Neurological:      General: No focal deficit present.      Mental Status: He is alert and oriented to person, place, and time.      Cranial Nerves: No cranial nerve deficit.      Sensory: No sensory deficit.   Psychiatric:         Mood and Affect: Mood normal.         Behavior: Behavior normal.         Outpatient Follow-Up  Future Appointments   Date Time Provider Department Center   3/27/2025  9:20 AM Viktor Cagle MD [id number] [place]   6/19/2025  8:40 AM Emma English MD [id number] [place]         Tex Stevens MD

## 2025-03-14 NOTE — NURSING NOTE
Pt discharged to home with home O2, IV access discontinued with tip intact. Spouse at bedside and will transport Pt home. Pt currently eating lunch.

## 2025-03-17 ENCOUNTER — HOME CARE VISIT (OUTPATIENT)
Dept: HOME HEALTH SERVICES | Facility: HOME HEALTH | Age: 80
End: 2025-03-17
Payer: MEDICARE

## 2025-03-17 ENCOUNTER — PATIENT OUTREACH (OUTPATIENT)
Dept: PRIMARY CARE | Facility: CLINIC | Age: 80
End: 2025-03-17
Payer: MEDICARE

## 2025-03-17 VITALS
HEART RATE: 90 BPM | SYSTOLIC BLOOD PRESSURE: 124 MMHG | RESPIRATION RATE: 20 BRPM | WEIGHT: 186 LBS | OXYGEN SATURATION: 96 % | TEMPERATURE: 98 F | DIASTOLIC BLOOD PRESSURE: 60 MMHG | BODY MASS INDEX: 29.89 KG/M2 | HEIGHT: 66 IN

## 2025-03-17 PROCEDURE — G0299 HHS/HOSPICE OF RN EA 15 MIN: HCPCS | Mod: HHH

## 2025-03-17 PROCEDURE — 169592 NO-PAY CLAIM PROCEDURE

## 2025-03-17 ASSESSMENT — ACTIVITIES OF DAILY LIVING (ADL): ENTERING_EXITING_HOME: MODERATE ASSIST

## 2025-03-17 NOTE — PROGRESS NOTES
Discharge Facility: [facility]  Discharge Diagnosis: Pneumonia, unspecified organism   Admission Date: 3/12/25  Discharge Date: 3/14/25 - [facility]    PCP Appointment Date: 3/27/25  Specialist Appointment Date:   - Dr. Delgado (pulmonology):  Hospital Encounter and Summary Linked: Yes  ED to Hosp-Admission (Discharged) with Tex Stevens MD; Brian Barker MD (03/12/2025)   See discharge assessment below for further details    Wrap Up  Wrap Up Additional Comments: Pt states he is doing a lot better and things are looking good; reports he notices a big difference and his breathing is better. Reviewed medication changes. Prefers to keep appt with PCP as scheduled. Reports the home care nurse will be coming out today. Is aware of Healthy at home referral as well. Denies questions/concerns at this time. (3/17/2025 11:28 AM)    Medications  Medications reviewed with patient/caregiver?: Yes (new/changes only) (3/17/2025 11:28 AM)  Is the patient having any side effects they believe may be caused by any medication additions or changes?: No (3/17/2025 11:28 AM)  Does the patient have all medications ordered at discharge?: Yes (3/17/2025 11:28 AM)  Care Management Interventions: No intervention needed (3/17/2025 11:28 AM)  Prescription Comments: START: augmentin, mucinex, Duo-neb, prednisone  STOP: Spiriva (3/17/2025 11:28 AM)  Is the patient taking all medications as directed (includes completed medication regime)?: Yes (3/17/2025 11:28 AM)  Care Management Interventions: Provided patient education (3/17/2025 11:28 AM)    Appointments  Does the patient have a primary care provider?: Yes (3/17/2025 11:28 AM)  Care Management Interventions: Verified appointment date/time/provider (3/17/2025 11:28 AM)  Has the patient kept scheduled appointments due by today?: Yes (3/17/2025 11:28 AM)  Care Management Interventions: Advised patient to keep appointment (3/17/2025 11:28 AM)    Self Management  What is the home health agency?: [facility]  (3/17/2025 11:28 AM)  Has home health visited the patient within 72 hours of discharge?: Call prior to 72 hours (3/17/2025 11:28 AM)  What Durable Medical Equipment (DME) was ordered?: oxygen (3/17/2025 11:28 AM)  Has all Durable Medical Equipment (DME) been delivered?: Yes (3/17/2025 11:28 AM)    Patient Teaching  Does the patient have access to their discharge instructions?: Yes (3/17/2025 11:28 AM)  Care Management Interventions: Reviewed instructions with patient (3/17/2025 11:28 AM)  What is the patient's perception of their health status since discharge?: Improving (3/17/2025 11:28 AM)  Is the patient/caregiver able to teach back the hierarchy of who to call/visit for symptoms/problems? PCP, Specialist, Home Health nurse, Urgent Care, ED, 911: Yes (3/17/2025 11:28 AM)

## 2025-03-18 ENCOUNTER — PATIENT OUTREACH (OUTPATIENT)
Dept: CARE COORDINATION | Age: 80
End: 2025-03-18
Payer: MEDICARE

## 2025-03-18 LAB
ATRIAL RATE: 117 BPM
P AXIS: 56 DEGREES
PR INTERVAL: 168 MS
Q ONSET: 216 MS
QRS COUNT: 20 BEATS
QRS DURATION: 74 MS
QT INTERVAL: 298 MS
QTC CALCULATION(BAZETT): 415 MS
QTC FREDERICIA: 372 MS
R AXIS: -14 DEGREES
T AXIS: 82 DEGREES
T OFFSET: 365 MS
VENTRICULAR RATE: 117 BPM

## 2025-03-18 SDOH — HEALTH STABILITY: MENTAL HEALTH: HOW OFTEN DO YOU HAVE A DRINK CONTAINING ALCOHOL?: 2-4 TIMES A MONTH

## 2025-03-18 SDOH — SOCIAL STABILITY: SOCIAL NETWORK
IN A TYPICAL WEEK, HOW MANY TIMES DO YOU TALK ON THE PHONE WITH FAMILY, FRIENDS, OR NEIGHBORS?: MORE THAN THREE TIMES A WEEK

## 2025-03-18 SDOH — HEALTH STABILITY: MENTAL HEALTH
DO YOU FEEL STRESS - TENSE, RESTLESS, NERVOUS, OR ANXIOUS, OR UNABLE TO SLEEP AT NIGHT BECAUSE YOUR MIND IS TROUBLED ALL THE TIME - THESE DAYS?: NOT AT ALL

## 2025-03-18 SDOH — ECONOMIC STABILITY: FOOD INSECURITY: WITHIN THE PAST 12 MONTHS, THE FOOD YOU BOUGHT JUST DIDN'T LAST AND YOU DIDN'T HAVE MONEY TO GET MORE.: NEVER TRUE

## 2025-03-18 SDOH — SOCIAL STABILITY: SOCIAL INSECURITY: WITHIN THE LAST YEAR, HAVE YOU BEEN HUMILIATED OR EMOTIONALLY ABUSED IN OTHER WAYS BY YOUR PARTNER OR EX-PARTNER?: NO

## 2025-03-18 SDOH — HEALTH STABILITY: MENTAL HEALTH: HOW OFTEN DO YOU HAVE SIX OR MORE DRINKS ON ONE OCCASION?: NEVER

## 2025-03-18 SDOH — HEALTH STABILITY: MENTAL HEALTH: HOW MANY DRINKS CONTAINING ALCOHOL DO YOU HAVE ON A TYPICAL DAY WHEN YOU ARE DRINKING?: 1 OR 2

## 2025-03-18 SDOH — ECONOMIC STABILITY: INCOME INSECURITY: IN THE PAST 12 MONTHS HAS THE ELECTRIC, GAS, OIL, OR WATER COMPANY THREATENED TO SHUT OFF SERVICES IN YOUR HOME?: NO

## 2025-03-18 SDOH — HEALTH STABILITY: PHYSICAL HEALTH
HOW OFTEN DO YOU NEED TO HAVE SOMEONE HELP YOU WHEN YOU READ INSTRUCTIONS, PAMPHLETS, OR OTHER WRITTEN MATERIAL FROM YOUR DOCTOR OR PHARMACY?: NEVER

## 2025-03-18 SDOH — HEALTH STABILITY: PHYSICAL HEALTH: ON AVERAGE, HOW MANY DAYS PER WEEK DO YOU ENGAGE IN MODERATE TO STRENUOUS EXERCISE (LIKE A BRISK WALK)?: 0 DAYS

## 2025-03-18 SDOH — SOCIAL STABILITY: SOCIAL NETWORK
DO YOU BELONG TO ANY CLUBS OR ORGANIZATIONS SUCH AS CHURCH GROUPS, UNIONS, FRATERNAL OR ATHLETIC GROUPS, OR SCHOOL GROUPS?: YES

## 2025-03-18 SDOH — ECONOMIC STABILITY: HOUSING INSECURITY: IN THE LAST 12 MONTHS, WAS THERE A TIME WHEN YOU WERE NOT ABLE TO PAY THE MORTGAGE OR RENT ON TIME?: NO

## 2025-03-18 SDOH — ECONOMIC STABILITY: FOOD INSECURITY: WITHIN THE PAST 12 MONTHS, YOU WORRIED THAT YOUR FOOD WOULD RUN OUT BEFORE YOU GOT THE MONEY TO BUY MORE.: NEVER TRUE

## 2025-03-18 SDOH — HEALTH STABILITY: MENTAL HEALTH: SMOKING IN HOME: 0

## 2025-03-18 SDOH — ECONOMIC STABILITY: HOUSING INSECURITY: EVIDENCE OF SMOKING MATERIAL: 0

## 2025-03-18 SDOH — SOCIAL STABILITY: SOCIAL INSECURITY: ARE YOU MARRIED, WIDOWED, DIVORCED, SEPARATED, NEVER MARRIED, OR LIVING WITH A PARTNER?: MARRIED

## 2025-03-18 SDOH — SOCIAL STABILITY: SOCIAL INSECURITY: WITHIN THE LAST YEAR, HAVE YOU BEEN AFRAID OF YOUR PARTNER OR EX-PARTNER?: NO

## 2025-03-18 SDOH — SOCIAL STABILITY: SOCIAL NETWORK: HOW OFTEN DO YOU GET TOGETHER WITH FRIENDS OR RELATIVES?: MORE THAN THREE TIMES A WEEK

## 2025-03-18 SDOH — SOCIAL STABILITY: SOCIAL NETWORK: HOW OFTEN DO YOU ATTEND MEETINGS OF THE CLUBS OR ORGANIZATIONS YOU BELONG TO?: MORE THAN 4 TIMES PER YEAR

## 2025-03-18 SDOH — SOCIAL STABILITY: SOCIAL NETWORK: HOW OFTEN DO YOU ATTEND CHURCH OR RELIGIOUS SERVICES?: MORE THAN 4 TIMES PER YEAR

## 2025-03-18 SDOH — HEALTH STABILITY: PHYSICAL HEALTH: ON AVERAGE, HOW MANY MINUTES DO YOU ENGAGE IN EXERCISE AT THIS LEVEL?: 0 MIN

## 2025-03-18 SDOH — ECONOMIC STABILITY: HOUSING INSECURITY: AT ANY TIME IN THE PAST 12 MONTHS, WERE YOU HOMELESS OR LIVING IN A SHELTER (INCLUDING NOW)?: NO

## 2025-03-18 SDOH — ECONOMIC STABILITY: HOUSING INSECURITY: IN THE PAST 12 MONTHS, HOW MANY TIMES HAVE YOU MOVED WHERE YOU WERE LIVING?: 1

## 2025-03-18 SDOH — ECONOMIC STABILITY: FOOD INSECURITY: HOW HARD IS IT FOR YOU TO PAY FOR THE VERY BASICS LIKE FOOD, HOUSING, MEDICAL CARE, AND HEATING?: NOT HARD AT ALL

## 2025-03-18 SDOH — ECONOMIC STABILITY: TRANSPORTATION INSECURITY: IN THE PAST 12 MONTHS, HAS LACK OF TRANSPORTATION KEPT YOU FROM MEDICAL APPOINTMENTS OR FROM GETTING MEDICATIONS?: NO

## 2025-03-18 ASSESSMENT — ACTIVITIES OF DAILY LIVING (ADL)
AMBULATION ASSISTANCE: STAND BY ASSIST
AMBULATION ASSISTANCE: 1
FEEDING: INDEPENDENT
LACK_OF_TRANSPORTATION: NO
TRANSPORTATION ASSESSED: 1
BATHING_CURRENT_FUNCTION: MINIMUM ASSIST
GROOMING_CURRENT_FUNCTION: MODERATE ASSIST
PREPARING MEALS: NEEDS ASSISTANCE
TOILETING: 1
DRESSING_UB_CURRENT_FUNCTION: STAND BY ASSIST
GROOMING ASSESSED: 1
DRESSING_LB_CURRENT_FUNCTION: MINIMUM ASSIST
BATHING ASSESSED: 1
PHYSICAL TRANSFERS ASSESSED: 1
OASIS_M1830: 03
FEEDING ASSESSED: 1
CURRENT_FUNCTION: MINIMUM ASSIST
TOILETING: INDEPENDENT

## 2025-03-18 ASSESSMENT — PAIN SCALES - PAIN ASSESSMENT IN ADVANCED DEMENTIA (PAINAD)
NEGVOCALIZATION: 0
BODYLANGUAGE: 0 - RELAXED.
FACIALEXPRESSION: 0 - SMILING OR INEXPRESSIVE.
CONSOLABILITY: 0
NEGVOCALIZATION: 0 - NONE.
BODYLANGUAGE: 0
BREATHING: 0
CONSOLABILITY: 0 - NO NEED TO CONSOLE.
TOTALSCORE: 0
FACIALEXPRESSION: 0

## 2025-03-18 ASSESSMENT — ENCOUNTER SYMPTOMS
SPUTUM AMOUNT: MODERATE
SPUTUM CONSISTENCY: THIN
SHORTNESS OF BREATH: 1
DEPRESSION: 0
LAST BOWEL MOVEMENT: 67281
HYPERTENSION: 1
COUGH: 1
COUGH CHARACTERISTICS: STRONG
DYSPNEA ON EXERTION: 1
CHANGE IN APPETITE: UNCHANGED
APPETITE LEVEL: GOOD
COUGH CHARACTERISTICS: MOIST
DENIES PAIN: 1
FATIGUES EASILY: 1
FATIGUE: 1
MUSCLE WEAKNESS: 1
LOSS OF SENSATION IN FEET: 0
SPUTUM PRODUCTION: 1
SPUTUM COLOR: WHITE
OCCASIONAL FEELINGS OF UNSTEADINESS: 1
COUGH CHARACTERISTICS: PRODUCTIVE
DYSPNEA ACTIVITY LEVEL: AFTER AMBULATING 10 - 20 FT

## 2025-03-18 ASSESSMENT — LIFESTYLE VARIABLES
AUDIT-C TOTAL SCORE: 2
SKIP TO QUESTIONS 9-10: 1

## 2025-03-18 NOTE — PROGRESS NOTES
Enrollment call completed. Pt with hx copd, no smoking since 1991. Denies hx dm, htn. Pt wears 3L NC at baseline. Uses CPAP at night. [employer]. Pt has a pulse ox at home, does not check bp or daily weight. Denies any medication assistance needed. Denies any food, social, housing or transportation needs. Pt has npv with  3/27. Pt starts [facility] tomorrow. Aware of daily calls, preferred time around 1000. Initial [facility] scheduled 3/20 @ 1400. Pt does not have access to [facility]-phone call appts only

## 2025-03-19 ENCOUNTER — PATIENT OUTREACH (OUTPATIENT)
Dept: CARE COORDINATION | Age: 80
End: 2025-03-19
Payer: MEDICARE

## 2025-03-19 ENCOUNTER — HOME CARE VISIT (OUTPATIENT)
Dept: HOME HEALTH SERVICES | Facility: HOME HEALTH | Age: 80
End: 2025-03-19
Payer: MEDICARE

## 2025-03-19 VITALS
DIASTOLIC BLOOD PRESSURE: 58 MMHG | TEMPERATURE: 97.4 F | OXYGEN SATURATION: 93 % | HEART RATE: 80 BPM | RESPIRATION RATE: 18 BRPM | SYSTOLIC BLOOD PRESSURE: 92 MMHG

## 2025-03-19 DIAGNOSIS — J44.1 COPD EXACERBATION (MULTI): Primary | ICD-10-CM

## 2025-03-19 PROCEDURE — G0300 HHS/HOSPICE OF LPN EA 15 MIN: HCPCS | Mod: HHH

## 2025-03-19 PROCEDURE — G0152 HHCP-SERV OF OT,EA 15 MIN: HCPCS | Mod: HHH | Performed by: OCCUPATIONAL THERAPIST

## 2025-03-19 SDOH — HEALTH STABILITY: MENTAL HEALTH: SMOKING IN HOME: 1

## 2025-03-19 SDOH — ECONOMIC STABILITY: HOUSING INSECURITY: EVIDENCE OF SMOKING MATERIAL: 0

## 2025-03-19 SDOH — ECONOMIC STABILITY: HOUSING INSECURITY: EVIDENCE OF SMOKING MATERIAL: 1

## 2025-03-19 SDOH — HEALTH STABILITY: MENTAL HEALTH: SMOKING IN HOME: 0

## 2025-03-19 ASSESSMENT — ENCOUNTER SYMPTOMS
DYSPNEA ACTIVITY LEVEL: AFTER AMBULATING MORE THAN 20 FT
LAST BOWEL MOVEMENT: 67283
BOWEL PATTERN NORMAL: 1
COUGH: 1
COUGH CHARACTERISTICS: PRODUCTIVE
PERSON REPORTING PAIN: PATIENT
SHORTNESS OF BREATH: 1
CHANGE IN APPETITE: UNCHANGED
DENIES PAIN: 1
STOOL FREQUENCY: DAILY
APPETITE LEVEL: GOOD

## 2025-03-19 ASSESSMENT — ACTIVITIES OF DAILY LIVING (ADL)
TOILETING: 1
CURRENT_FUNCTION: INDEPENDENT
AMBULATION ASSISTANCE: SUPERVISION
PHYSICAL TRANSFERS ASSESSED: 1
BATHING_CURRENT_FUNCTION: SUPERVISION
BATHING ASSESSED: 1
DRESSING_LB_CURRENT_FUNCTION: INDEPENDENT
TOILETING: INDEPENDENT
AMBULATION ASSISTANCE: 1

## 2025-03-19 NOTE — PROGRESS NOTES
Daily Call Note: Daily call to the patient completed. He stated he was doing well. With the initial call he was taking a nebulizer treatment and requested a call back in 15 minutes.     The patient was called as requested - he stated that he was doing very well - no SOB or chest pain - he continues to use supplemental oxygen at 3L per NC - he wears it intermittently. During the conversation he confirmed that his O2 saturation at that point was fluctuating between 88 and 89% - he stated that he did not feel compromised in any was re: respiratory status. He was advised that there were different oxygen requirements based on the pulmonary status and I was not able to tell him what level was acceptable by his provider - He has not been given a range within which he should keep the SpO2. He was advised to try to keep it at 92% or above until this could be clarified, and to use his supplemental oxygen when it drops.  He was reluctant to use it and stated that he was comfortable. He was advised to discuss this with the provider during the [facility]VC, or with his pulmonologist to determine parameters for oxygen use. He stated that his SpO2 is usually around 89-90%. He and his wife were reminded of the [facility] appointment tomorrow at 1400. There was confusion between HHVC and HC PT appointment times which were clarified with the wife.    Pt demonstrates clear understanding: Yes    Daily Weight:  There were no vitals filed for this visit.   Last 3 Weights:  Wt Readings from Last 7 Encounters:   03/17/25 84.4 kg (186 lb)   03/12/25 79.4 kg (175 lb)   12/12/24 70.3 kg (155 lb)   11/06/24 68.8 kg (151 lb 9.6 oz)   06/19/24 64.9 kg (143 lb)   12/14/23 69.9 kg (154 lb)   06/26/23 65.8 kg (145 lb 1 oz)       Masimo Device: No   Masimo Clinical Impression:     Virtual Visits--Scheduled (Most Recent Date at Top)  Follow up Appointments  Recent Visits  No visits were found meeting these conditions.  Showing recent visits within past 30 days and  meeting all other requirements  Future Appointments  Date Type Provider Dept   03/27/25 Appointment Viktor [id number]   Showing future appointments within next 90 days and meeting all other requirements       Frequency of RN Calls & Virtual Visits per Team Agreement: [facility] Home Frequency: Daily

## 2025-03-20 ENCOUNTER — HOME CARE VISIT (OUTPATIENT)
Dept: HOME HEALTH SERVICES | Facility: HOME HEALTH | Age: 80
End: 2025-03-20
Payer: MEDICARE

## 2025-03-20 ENCOUNTER — TELEMEDICINE (OUTPATIENT)
Dept: PHARMACY | Facility: HOSPITAL | Age: 80
End: 2025-03-20
Payer: MEDICARE

## 2025-03-20 ENCOUNTER — TELEMEDICINE CLINICAL SUPPORT (OUTPATIENT)
Dept: CARE COORDINATION | Age: 80
End: 2025-03-20
Payer: MEDICARE

## 2025-03-20 ENCOUNTER — PATIENT OUTREACH (OUTPATIENT)
Dept: CARE COORDINATION | Age: 80
End: 2025-03-20

## 2025-03-20 VITALS
OXYGEN SATURATION: 92 % | DIASTOLIC BLOOD PRESSURE: 60 MMHG | SYSTOLIC BLOOD PRESSURE: 95 MMHG | HEART RATE: 80 BPM | TEMPERATURE: 98 F

## 2025-03-20 DIAGNOSIS — J44.1 COPD EXACERBATION (MULTI): ICD-10-CM

## 2025-03-20 DIAGNOSIS — J44.1 COPD EXACERBATION (MULTI): Primary | ICD-10-CM

## 2025-03-20 PROCEDURE — G0151 HHCP-SERV OF PT,EA 15 MIN: HCPCS | Mod: HHH

## 2025-03-20 RX ORDER — PREDNISONE 20 MG/1
40 TABLET ORAL DAILY
Qty: 10 TABLET | Refills: 0 | Status: SHIPPED | OUTPATIENT
Start: 2025-03-20

## 2025-03-20 RX ORDER — AZITHROMYCIN 500 MG/1
500 TABLET, FILM COATED ORAL DAILY
Qty: 3 TABLET | Refills: 0 | Status: SHIPPED | OUTPATIENT
Start: 2025-03-20

## 2025-03-20 RX ORDER — FLUTICASONE FUROATE, UMECLIDINIUM BROMIDE AND VILANTEROL TRIFENATATE 100; 62.5; 25 UG/1; UG/1; UG/1
1 POWDER RESPIRATORY (INHALATION) DAILY
Qty: 60 EACH | Refills: 2 | Status: SHIPPED | OUTPATIENT
Start: 2025-03-20

## 2025-03-20 SDOH — HEALTH STABILITY: MENTAL HEALTH: SMOKING IN HOME: 0

## 2025-03-20 SDOH — ECONOMIC STABILITY: HOUSING INSECURITY: EVIDENCE OF SMOKING MATERIAL: 0

## 2025-03-20 ASSESSMENT — BALANCE ASSESSMENTS
SITTING BALANCE: 1 - STEADY, SAFE
STANDING BALANCE: 2 - NARROW STANCE WITHOUT SUPPORT
ARISING SCORE: 1
BALANCE SCORE: 14
ATTEMPTS TO ARISE: 2 - ABLE TO RISE, ONE ATTEMPT
IMMEDIATE STANDING BALANCE FIRST 5 SECONDS: 2 - STEADY WITHOUT WALKER OR OTHER SUPPORT
SITTING DOWN: 1 - USES ARMS OR NOT SMOOTH MOTION
ARISES: 1 - ABLE, USES ARMS TO HELP
NUDGED: 2 - STEADY
NUDGED SCORE: 2
EYES CLOSED AT MAXIMUM POSITION NUDGED: 1 - STEADY
TURNING 360 DEGREES STEPS: 1 - CONTINUOUS STEPS

## 2025-03-20 ASSESSMENT — GAIT ASSESSMENTS
WALKING STANCE: 1 - HEELS ALMOST TOUCHING WHILE WALKING
TRUNK SCORE: 2
STEP CONTINUITY: 1 - STEPS APPEAR CONTINUOUS
INITIATION OF GAIT IMMEDIATELY AFTER GO: 1 - NO HESITANCY
PATH SCORE: 2
TRUNK: 2 - NO SWAY, NO FLEXION, NO USE OF ARMS, NO WALKING AID
STEP SYMMETRY: 1 - RIGHT AND LEFT STEP LENGTH APPEAR EQUAL
BALANCE AND GAIT SCORE: 24
PATH: 2 - STRAIGHT WITHOUT WALKING AID
GAIT SCORE: 10

## 2025-03-20 ASSESSMENT — ENCOUNTER SYMPTOMS
DENIES PAIN: 1
PERSON REPORTING PAIN: PATIENT
MUSCLE WEAKNESS: 1

## 2025-03-20 ASSESSMENT — ACTIVITIES OF DAILY LIVING (ADL)
AMBULATION ASSISTANCE ON FLAT SURFACES: 1
AMBULATION_DISTANCE/DURATION_TOLERATED: 100 FT
AMBULATION ASSISTANCE: STAND BY ASSIST
AMBULATION ASSISTANCE: ONE PERSON

## 2025-03-20 NOTE — PROGRESS NOTES
Daily Call Note: [facility] weekly visit complete w this RN, Nilsa NP, and Celine Pharm D.  Denies CP/ SOB/ Edema  POX 92%  RA  POX 96 % O2 at 3l  Wears oxygen intermittently ATC  Does not wear Cpap/ Bipap, states he has never worn either  PAP program, pt does not feel he will meet requirements  All medications reviewed, will switch to Trelegy  Rescue kit reviewed, pt agreeable, to have Medic visit next week  Has several upcoming appts  Scheduled upcoming weekly [facility] visit    Pt Education: POC  Barriers:   Topics for Daily Review:   Pt demonstrates clear understanding: Yes    Daily Weight:  There were no vitals filed for this visit.   Last 3 Weights:  Wt Readings from Last 7 Encounters:   03/17/25 84.4 kg (186 lb)   03/12/25 79.4 kg (175 lb)   12/12/24 70.3 kg (155 lb)   11/06/24 68.8 kg (151 lb 9.6 oz)   06/19/24 64.9 kg (143 lb)   12/14/23 69.9 kg (154 lb)   06/26/23 65.8 kg (145 lb 1 oz)       Masimo Device: No   Masimo Clinical Impression:     Virtual Visits--Scheduled (Most Recent Date at Top)  Follow up Appointments  Recent Visits  No visits were found meeting these conditions.  Showing recent visits within past 30 days and meeting all other requirements  Future Appointments  Date Type Provider Dept   03/27/25 Appointment Viktor Cagle MD Do [id number]   Showing future appointments within next 90 days and meeting all other requirements       Frequency of RN Calls & Virtual Visits per Team Agreement: [facility] Frequency: Daily    Medication issues Addressed (what was done):   Follow up appointments scheduled by [facility] Staff:   Referrals made by [facility] staff:

## 2025-03-20 NOTE — PROGRESS NOTES
Healthy at Home [facility]    Sindhu Salazar is a 79 y.o. male was referred to Clinical Pharmacy Team to complete a post-discharge medication optimization and monitoring visit.  The patient was referred for COPD management while in [facility]. Today was our initial visit.     Admission Date: 3/12  Discharge Date: 3/14    Referring Provider: Kareen Ash APRN*  PCP: Viktor Cagle MD - next visit: 3/27 (new visit)      Subjective   Allergies   Allergen Reactions    Iodinated Contrast Media Hives    Iodine Hives    Sulfa (Sulfonamide Antibiotics) Rash       [id number] - [place], [place] - [address] [address]  [address] [address] [address]  Phone: [phone] Fax: [phone]    [facility] - [place] - [facility] AT [address] [facility]  [address] [place] [address]  Phone: [phone] Fax: [phone]    [facility] #[id number] - [place], [place] - [address] [facility] AT [facility]  [address] [place] [address]  Phone: [phone] Fax: [phone]      Medication System Management:  Affordability/Accessibility: no issues  Adherence/Organization: no concern      Social History     Social History Narrative    Not on file        Notable Medication changes following discharge:  Start: augmentin 875/125 BID x 5 days, mucinex BID x 5 days, duoneb TID   Stop: spiriva  Change: n/a    HPI      Review of Systems        Objective     There were no vitals taken for this visit.   BP Readings from Last 4 Encounters:   03/20/25 95/60   03/19/25 92/58   03/17/25 124/60   03/14/25 100/61      There were no vitals filed for this visit.     LAB  Lab Results   Component Value Date    BILITOT 1.0 03/12/2025    CALCIUM 8.8 03/14/2025    CO2 26 03/14/2025     03/14/2025    CREATININE 0.73 03/14/2025    GLUCOSE 95 03/14/2025    ALKPHOS 60 03/12/2025    K 4.1 03/14/2025    PROT 8.3 (H) 03/12/2025     03/14/2025    AST 14 03/12/2025     "ALT 10 03/12/2025    BUN 19 03/14/2025    ANIONGAP 10 03/14/2025    MG 2.09 03/14/2025    ALBUMIN 4.3 03/12/2025     No results found for: \"TRIG\", \"CHOL\", \"LDLCALC\", \"HDL\"  No results found for: \"HGBA1C\"      Current Outpatient Medications   Medication Instructions    acetylcysteine (MUCOMYST) 800 mg, nebulization, 2 times daily RT    albuterol (Ventolin HFA) 90 mcg/actuation inhaler 2 puffs, Every 4 hours PRN    ipratropium-albuteroL (Duo-Neb) 0.5-2.5 mg/3 mL nebulizer solution 3 mL, nebulization, 3 times daily RT    mometasone-formoterol (Dulera 200) 200-5 mcg/actuation inhaler 2 puffs, inhalation, 2 times daily RT    oxygen (O2) gas therapy 1 each, inhalation, Every 24 hours        HISTORICAL PHARMACOTHERAPY      DRUG INTERACTIONS  None at time of review      Assessment/Plan   Problem List Items Addressed This Visit       COPD exacerbation (Multi)     -O2 92% on room air  -O2 usually in low 90s without oxygen, goes up to 96% with oxygen (3L NC)   -still coughing   -using nebulizer machine and albuterol HFA   -wears oxygen most hours of the day with some breaks   -has never worn a CPAP at home   -breathing ok per patient   -confirmed he completed augmentin and prednisone   -confirmed he never started spiriva likely due to cost   -has a home pulse ox       Medications Changes/Concerns:  COPD   Current GDMT: Dulera 200-5 mcg/act (LABA/ICS) + albuterol HFA PRN   Will DISCONTINUE Dulera and START Trelegy 100-62.5-25 mcg/act 1 puff once daily (triple maintenance inhaler) per GOLD guidelines   Patient reports he was previously prescribed Spiriva but never started it likely due to cost   Continue albuterol HFA PRN (rescue inhaler)       Refills Needed:  -none needed today      Plan/To Do:  -send rescue kit to [facility] to be delivered by medic on 3/26  -send Trelegy rx to [facility]   -continue logging daily O2 checks  -nursing will continue with daily calls       [facility] PAP Status:  -discussed that he would not qualify based on joint " income   -paid $174 for 90 day supply of Dulera - discussed that Trelegy would be $30 for 30 days    Time Spent with Patient and [facility] Team - Kareen Ash NP and Allyn SEBASTIAN RN via phone call: 20 minutes     Follow Up: 1 week     Continue all meds under the continuation of care with the referring provider and clinical pharmacy team.    Celine Abbasi, PharmD     Verbal consent to manage patient's drug therapy was obtained from the patient and an individual authorized to act on behalf of a patient. They were informed they may decline to participate or withdraw from participation in pharmacy services at any time.

## 2025-03-20 NOTE — PROGRESS NOTES
" Healthy at Home Virtual Visit     Admission Date: 3/12  Discharge Date: 3/14  Discharging Facility: [facility]   PCP: Dr. Cagle   [facility] Visit: Initial      Admission Dx and Associated Referral Dx: Pneumonia, COPD (3L chronic at night)      Brief summary of hospitalization or reason for referral: 79 y.o. male with history of Prostate Cancer, Basal cell carcinoma of the face, and COPD on 3 lpm nasal cannula at night, who is a former smoker (~75 pack years) presented to [facility] ER with chief complaints of cough and shortness of breath. Patient was admitted to the hospital with acute on chronic respiratory failure with hypoxia as evidenced by requiring greater than 3 L nasal cannula to maintain SpO2 greater than 90%. Patient has COPD and is on 3 L nasal cannula at night only but was requiring during the day and discharged on this dose. He was given IV antibiotics during stay and discharged with RX for augmentin 875/125 BID x 5 days and Prednisone 40 mg x 3 days. He was instructed to follow up with PCP and [facility].       Interval Subjective: Patient lives at home with wife Sharmila- she is also present on call. He states his breathing feels better since discharge from hospital. Is using 3L NC intermittently through the day PRN- he wears at night mainly. He still endorses a mild non productive cough.     SPO2: 92% on RA and 96% on 3L NC       Masimo: No  Oxygen: Yes 3L intermittently during the day and chronically at night             Medications Issues/Refill need  Medication Follow up action needed:Order trelegy- instructed to stop Dulera       Interval or Pertinent Labs/Testing:  Lab Review:   No results found for: \"HGBA1C\"    Lab Results   Component Value Date     03/14/2025    K 4.1 03/14/2025     03/14/2025    CO2 26 03/14/2025    BUN 19 03/14/2025    CREATININE 0.73 03/14/2025    GLUCOSE 95 03/14/2025    CALCIUM 8.8 03/14/2025     Lab Results   Component Value Date    WBC 10.6 " 03/14/2025    HGB 12.1 (L) 03/14/2025    HCT 36.0 (L) 03/14/2025    MCV 96 03/14/2025     03/14/2025        Social Determinants of Health:  Medication Finances:  Not Eligible for 340[employer] Patient Assistance  Transportation:  No Issues  Access to Food:  No issues         Assessment/Plan    #COPD  #Pneumonia   - wears 3L nocturnally at baseline- now wearing intermittently during the day  -Finished Augmentin and prednisone  -Currently taking Dulera, confirmed not taking Spiriva  -We discussed switching to Trelegy, he is agreeable- this would be $ 30   -Continue IS   -follows with pulm appointment 6/19  -Quit smoking in 1991  -Has Home Health- PT and OT  -Will order rescue kit       Upcoming Appointments:   3/27: PCP  6/19: Pulm     Telemedicine Visit:  Patient Location during Visit:  [place]  Visit Modality:  Telephone  Additional Visit Participants:  Wife,  Sharmila   Patient/Proxy verbally consents to Evaluation and Treatment     Kareen GUZMAN, CNP  General Internal Medicine  [facility]

## 2025-03-21 ENCOUNTER — TELEPHONE (OUTPATIENT)
Facility: CLINIC | Age: 80
End: 2025-03-21
Payer: MEDICARE

## 2025-03-21 NOTE — TELEPHONE ENCOUNTER
Patient was recently discharged from hospital for pneumonia. He is currently on Dulera but Healthy at home wants to discontinue Dulera and replace it with Trilogy and add Spiriva. Patient's wife wants patient to continue Dulera and add Spiriva. Please advise

## 2025-03-22 ENCOUNTER — PATIENT OUTREACH (OUTPATIENT)
Dept: CARE COORDINATION | Age: 80
End: 2025-03-22
Payer: MEDICARE

## 2025-03-22 NOTE — PROGRESS NOTES
Daily Call Note:       Daily call completed with the patient.  States he has no issues.  Breathing is doing good.  He is on 3 L NC.  PCP appt on 3/27. No assistance needs. No questions or concerns during the call.       Pt Education:   Barriers:   Topics for Daily Review:  Pt demonstrates clear understanding:     Daily Weight:  There were no vitals filed for this visit.   Last 3 Weights:  Wt Readings from Last 7 Encounters:   03/17/25 84.4 kg (186 lb)   03/12/25 79.4 kg (175 lb)   12/12/24 70.3 kg (155 lb)   11/06/24 68.8 kg (151 lb 9.6 oz)   06/19/24 64.9 kg (143 lb)   12/14/23 69.9 kg (154 lb)   06/26/23 65.8 kg (145 lb 1 oz)       Masimo Device:    Masimo Clinical Impression:    Virtual Visits--Scheduled (Most Recent Date at Top)  Follow up Appointments  Recent Visits  No visits were found meeting these conditions.  Showing recent visits within past 30 days and meeting all other requirements  Future Appointments  Date Type Provider Dept   03/27/25 Appointment Viktor Cagle MD Do [id number]   Showing future appointments within next 90 days and meeting all other requirements       Frequency of RN Calls & Virtual Visits per Team Agreement:     Medication issues Addressed (what was done):     Follow up appointments scheduled by [facility] Staff:  Referrals made by [facility] staff:

## 2025-03-24 ENCOUNTER — PATIENT OUTREACH (OUTPATIENT)
Dept: CARE COORDINATION | Age: 80
End: 2025-03-24
Payer: MEDICARE

## 2025-03-24 ENCOUNTER — HOME CARE VISIT (OUTPATIENT)
Dept: HOME HEALTH SERVICES | Facility: HOME HEALTH | Age: 80
End: 2025-03-24
Payer: MEDICARE

## 2025-03-24 VITALS
HEART RATE: 80 BPM | SYSTOLIC BLOOD PRESSURE: 125 MMHG | OXYGEN SATURATION: 97 % | RESPIRATION RATE: 18 BRPM | TEMPERATURE: 98.3 F | DIASTOLIC BLOOD PRESSURE: 68 MMHG

## 2025-03-24 PROCEDURE — G0300 HHS/HOSPICE OF LPN EA 15 MIN: HCPCS | Mod: HHH

## 2025-03-24 SDOH — HEALTH STABILITY: MENTAL HEALTH: SMOKING IN HOME: 0

## 2025-03-24 SDOH — ECONOMIC STABILITY: HOUSING INSECURITY: EVIDENCE OF SMOKING MATERIAL: 0

## 2025-03-24 ASSESSMENT — ENCOUNTER SYMPTOMS
STOOL FREQUENCY: DAILY
LAST BOWEL MOVEMENT: 67288
DENIES PAIN: 1
CHANGE IN APPETITE: UNCHANGED
PERSON REPORTING PAIN: PATIENT
FATIGUES EASILY: 1
DYSPNEA ACTIVITY LEVEL: AFTER AMBULATING MORE THAN 20 FT
MUSCLE WEAKNESS: 1
BOWEL PATTERN NORMAL: 1
APPETITE LEVEL: GOOD
SHORTNESS OF BREATH: 1

## 2025-03-24 NOTE — PROGRESS NOTES
Daily Call Note: [facility] daily call complete. Call completed w pt's wife.    Denies CP/SOB/ edema  All questions/ concerns addressed  Verified upcoming weekly [facility] call    Pt Education:  POC   Barriers:   Topics for Daily Review:   Pt demonstrates clear understanding: Yes    Daily Weight:  There were no vitals filed for this visit.   Last 3 Weights:  Wt Readings from Last 7 Encounters:   03/17/25 84.4 kg (186 lb)   03/12/25 79.4 kg (175 lb)   12/12/24 70.3 kg (155 lb)   11/06/24 68.8 kg (151 lb 9.6 oz)   06/19/24 64.9 kg (143 lb)   12/14/23 69.9 kg (154 lb)   06/26/23 65.8 kg (145 lb 1 oz)       Masimo Device: No   Masimo Clinical Impression:     Virtual Visits--Scheduled (Most Recent Date at Top)  Follow up Appointments  Recent Visits  No visits were found meeting these conditions.  Showing recent visits within past 30 days and meeting all other requirements  Future Appointments  Date Type Provider Dept   03/27/25 Appointment [id number]   Showing future appointments within next 90 days and meeting all other requirements       Frequency of RN Calls & Virtual Visits per Team Agreement: Healthy at Home Frequency: Daily    Medication issues Addressed (what was done):     Follow up appointments scheduled by [facility] Staff:   Referrals made by [facility] staff:

## 2025-03-26 ENCOUNTER — PHARMACY VISIT (OUTPATIENT)
Dept: PHARMACY | Facility: CLINIC | Age: 80
End: 2025-03-26
Payer: MEDICARE

## 2025-03-26 ENCOUNTER — DOCUMENTATION (OUTPATIENT)
Dept: CARE COORDINATION | Age: 80
End: 2025-03-26

## 2025-03-26 ENCOUNTER — HOME CARE VISIT (OUTPATIENT)
Dept: HOME HEALTH SERVICES | Facility: HOME HEALTH | Age: 80
End: 2025-03-26
Payer: MEDICARE

## 2025-03-26 VITALS
DIASTOLIC BLOOD PRESSURE: 70 MMHG | OXYGEN SATURATION: 94 % | SYSTOLIC BLOOD PRESSURE: 118 MMHG | HEART RATE: 80 BPM | TEMPERATURE: 98.2 F | RESPIRATION RATE: 18 BRPM

## 2025-03-26 PROCEDURE — G0300 HHS/HOSPICE OF LPN EA 15 MIN: HCPCS | Mod: HHH

## 2025-03-26 PROCEDURE — RXMED WILLOW AMBULATORY MEDICATION CHARGE

## 2025-03-26 ASSESSMENT — ENCOUNTER SYMPTOMS
STOOL FREQUENCY: DAILY
LAST BOWEL MOVEMENT: 67290
DENIES PAIN: 1
DYSPNEA ACTIVITY LEVEL: AFTER AMBULATING MORE THAN 20 FT
APPETITE LEVEL: GOOD
CHANGE IN APPETITE: UNCHANGED
MUSCLE WEAKNESS: 1
BOWEL PATTERN NORMAL: 1
SHORTNESS OF BREATH: 1
FATIGUES EASILY: 1
PERSON REPORTING PAIN: PATIENT

## 2025-03-27 ENCOUNTER — APPOINTMENT (OUTPATIENT)
Dept: PRIMARY CARE | Facility: CLINIC | Age: 80
End: 2025-03-27
Payer: MEDICARE

## 2025-03-27 ENCOUNTER — TELEMEDICINE CLINICAL SUPPORT (OUTPATIENT)
Dept: CARE COORDINATION | Age: 80
End: 2025-03-27
Payer: MEDICARE

## 2025-03-27 ENCOUNTER — PATIENT OUTREACH (OUTPATIENT)
Dept: CARE COORDINATION | Age: 80
End: 2025-03-27

## 2025-03-27 ENCOUNTER — TELEMEDICINE (OUTPATIENT)
Dept: PHARMACY | Facility: HOSPITAL | Age: 80
End: 2025-03-27
Payer: MEDICARE

## 2025-03-27 VITALS
SYSTOLIC BLOOD PRESSURE: 121 MMHG | HEART RATE: 81 BPM | BODY MASS INDEX: 24.11 KG/M2 | OXYGEN SATURATION: 85 % | DIASTOLIC BLOOD PRESSURE: 78 MMHG | HEIGHT: 66 IN | WEIGHT: 150 LBS

## 2025-03-27 DIAGNOSIS — R53.81 DEBILITY: ICD-10-CM

## 2025-03-27 DIAGNOSIS — R81 GLUCOSURIA: ICD-10-CM

## 2025-03-27 DIAGNOSIS — Z13.220 SCREENING, LIPID: ICD-10-CM

## 2025-03-27 DIAGNOSIS — L82.1 SEBORRHEIC KERATOSIS: ICD-10-CM

## 2025-03-27 DIAGNOSIS — R26.0 ATAXIC GAIT: ICD-10-CM

## 2025-03-27 DIAGNOSIS — D64.9 ANEMIA, NORMOCYTIC NORMOCHROMIC: ICD-10-CM

## 2025-03-27 DIAGNOSIS — J44.89 COPD (CHRONIC OBSTRUCTIVE PULMONARY DISEASE) WITH CHRONIC BRONCHITIS (MULTI): Primary | ICD-10-CM

## 2025-03-27 DIAGNOSIS — J44.1 COPD EXACERBATION (MULTI): ICD-10-CM

## 2025-03-27 DIAGNOSIS — R41.3 MEMORY LOSS: ICD-10-CM

## 2025-03-27 DIAGNOSIS — R73.9 HYPERGLYCEMIA: ICD-10-CM

## 2025-03-27 DIAGNOSIS — J44.1 COPD EXACERBATION (MULTI): Primary | ICD-10-CM

## 2025-03-27 DIAGNOSIS — E83.42 HYPOMAGNESEMIA: ICD-10-CM

## 2025-03-27 DIAGNOSIS — E55.9 VITAMIN D DEFICIENCY: ICD-10-CM

## 2025-03-27 DIAGNOSIS — R03.0 BLOOD PRESSURE ELEVATED WITHOUT HISTORY OF HTN: ICD-10-CM

## 2025-03-27 PROCEDURE — 1111F DSCHRG MED/CURRENT MED MERGE: CPT | Performed by: INTERNAL MEDICINE

## 2025-03-27 PROCEDURE — G2211 COMPLEX E/M VISIT ADD ON: HCPCS | Performed by: INTERNAL MEDICINE

## 2025-03-27 PROCEDURE — 1036F TOBACCO NON-USER: CPT | Performed by: INTERNAL MEDICINE

## 2025-03-27 PROCEDURE — 1160F RVW MEDS BY RX/DR IN RCRD: CPT | Performed by: INTERNAL MEDICINE

## 2025-03-27 PROCEDURE — 1159F MED LIST DOCD IN RCRD: CPT | Performed by: INTERNAL MEDICINE

## 2025-03-27 PROCEDURE — 99204 OFFICE O/P NEW MOD 45 MIN: CPT | Performed by: INTERNAL MEDICINE

## 2025-03-27 RX ORDER — MOMETASONE FUROATE AND FORMOTEROL FUMARATE DIHYDRATE 50; 5 UG/1; UG/1
1 AEROSOL RESPIRATORY (INHALATION)
COMMUNITY

## 2025-03-27 RX ORDER — GUAIFENESIN 1200 MG
325 TABLET, EXTENDED RELEASE 12 HR ORAL AS NEEDED
COMMUNITY

## 2025-03-27 RX ORDER — TALC
POWDER (GRAM) TOPICAL
Qty: 180 TABLET | Refills: 0 | Status: SHIPPED | OUTPATIENT
Start: 2025-03-27

## 2025-03-27 NOTE — PATIENT INSTRUCTIONS
Thank you very much for coming.  It is a pleasure to meet you and your arnulfo wife!    I am glad that you are following with your LUNG SPECIALIST.  Continue with DULERA and with your MUCOMYST.  Continue using OXYGEN as needed.  I would recommend that you use oxygen at bedtime if it is available.    In the meantime, your lungs are relatively clear.  Let me repeat some blood and let us do some urine examinations to verify how you are doing.    With your history of LOW MAGNESIUM, let us place you on a magnesium supplement 250 mg.  Take this with breakfast and again with supper.  Always with food please.  Magnesium is good for your lungs, as well as your heart and your other muscles.    I am glad to see that you are using a CANE.  In the future, once you get stronger, we will determine if you will benefit from outpatient PHYSICAL THERAPY, especially with your history of back strain.    TYLENOL EXTRA STRENGTH 500 mg, take 2 tablets by mouth every 6 hours as needed for pain, up to 6 tablets in 24 hours.  I mention Tylenol because it is safe to take with or without food, and can be taken with your other medications safely.    I do understand that you are concerned about your memory.  Let us have some BLOOD and URINE examinations done anytime soon.  You can have these done at any  facility convenient to you.    During this last evaluation, you were also noted to have low red cell count or ANEMIA.  Watch out for gum or nose bleeding.  Watch out for any blood in your urine or stool.  Let me know if you notice anything.  I will check some markers in your blood and urine as well.    Your blood pressure at 1 point was elevated, but now, it is good.  You do not need any medication for your blood pressure at this time.    Again, thank you very much for coming.  I do appreciate your visit!  Please make sure you are getting enough rest and sleep.  Please make sure you are eating sensibly.  Do not miss meals.  Please make sure that  you are drinking enough fluids throughout the day.  You have to avoid dehydration.  Please stay physically active as much as you can tolerate.  Lots of walking will be good.  In the future, we will talk about physical therapy.    Please make sure that you have a MULTIVITAMIN like Centrum Silver.  Take this with BREAKFAST every day.    Again, thank you very much for coming.  I hope you are having a blessed CUARESMA and I do wish you Felices Pascuas!  Take care and God bless.  It was a pleasure to meet your wife, Sharmila, as well.            0  Return in 6 weeks.  40 minutes please.  Reassess debility, prioritize issues.  Coordinate with PULMONOLOGY.  Reassess mood, energy, function, cognition, consider physical therapy.  Rule out self-neglect, caregiver stress.  Review preventive strategies, shingles vaccination.  Reassess hearing.            0

## 2025-03-27 NOTE — PROGRESS NOTES
" Healthy at Home Virtual Visit     Admission Date: 3/12  Discharge Date: 3/14  Discharging Facility: [facility]   PCP: Dr. Cagle   [facility] Visit: Second     Admission Dx and Associated Referral Dx: Pneumonia, COPD (3L chronic at night)      Brief summary of hospitalization or reason for referral: 79 y.o. male with history of Prostate Cancer, Basal cell carcinoma of the face, and COPD on 3 lpm nasal cannula at night, who is a former smoker (~75 pack years) presented to [facility] ER with chief complaints of cough and shortness of breath. Patient was admitted to the hospital with acute on chronic respiratory failure with hypoxia as evidenced by requiring greater than 3 L nasal cannula to maintain SpO2 greater than 90%. Patient has COPD and is on 3 L nasal cannula at night only but was requiring during the day and discharged on this dose. He was given IV antibiotics during stay and discharged with RX for augmentin 875/125 BID x 5 days and Prednisone 40 mg x 3 days. He was instructed to follow up with PCP and [facility].       Interval Subjective:   Saw PCP today, added MG supplement and ordered blood work which he will have done this week  States his breathing has overall improved. Is not wearing oxygen during the day- only wearing at night. SPO2 above 92% during the day. Denies chest pain and cough.       Masimo: No  Oxygen: Yes 3L at night            Medications Issues/Refill need  Medication Follow up action needed:  States he does not want Trelegy but would rather take Dulera and Incruse Ellipta      Interval or Pertinent Labs/Testing:  Lab Review:   No results found for: \"HGBA1C\"    Lab Results   Component Value Date     03/14/2025    K 4.1 03/14/2025     03/14/2025    CO2 26 03/14/2025    BUN 19 03/14/2025    CREATININE 0.73 03/14/2025    GLUCOSE 95 03/14/2025    CALCIUM 8.8 03/14/2025     Lab Results   Component Value Date    WBC 10.6 03/14/2025    HGB 12.1 (L) 03/14/2025    HCT 36.0 (L) " 03/14/2025    MCV 96 03/14/2025     03/14/2025        Social Determinants of Health:  Medication Finances:  Not Eligible for 340[employer] Patient Assistance  Transportation:  No Issues  Access to Food:  No issues         Assessment/Plan    #COPD  #Pneumonia   - was wearing 3 L daily and nightly now wearing only at night   -Finished Augmentin and prednisone  -He does not want Trelegy for now but would rather take Dulera plus Incruse since he already has 3 month supply of Dulera    -Continue IS   -follows with pulm appointment 6/19  -Quit smoking in 1991  -Has Home Health- PT and OT  -Now has rescue kit       Upcoming Appointments:   6/19: Pulm     Telemedicine Visit:  Patient Location during Visit:  [place]  Visit Modality:  Telephone  Additional Visit Participants:  Wife,  Sharmila   Patient/Proxy verbally consents to Evaluation and Treatment     Kareen GUZMAN, CNP  General Internal Medicine  Healthy at Home [facility]

## 2025-03-27 NOTE — PROGRESS NOTES
"Healthy at Home Virtual Clinic    Sindhu Salazar is a 79 y.o. male was referred to Clinical Pharmacy Team to complete a post-discharge medication optimization and monitoring visit.  The patient was referred for COPD management while in [facility]. Today was our second visit.       Referring Provider: Kareen Ash APRN*  PCP: Viktor Cagle MD - last visit: 3/27      Subjective   Allergies   Allergen Reactions    Iodinated Contrast Media Hives    Iodine Hives    Sulfa (Sulfonamide Antibiotics) Rash       [id number] - [place], [place] - [address] [address]  [address] [address] [address]  Phone: [phone] Fax: [phone]    [facility]/pharmacy #[id number] - [place], [place] - [address] [address] [facility]  [id number] [place] [address]  Phone: [phone] Fax: [phone]      Medication System Management:  Affordability/Accessibility: no issues  Adherence/Organization: no concerns      Social History     Social History Narrative    Not on file          HPI      Review of Systems        Objective     There were no vitals taken for this visit.   BP Readings from Last 4 Encounters:   03/27/25 121/78   03/26/25 118/70   03/24/25 125/68   03/20/25 95/60      There were no vitals filed for this visit.     LAB  Lab Results   Component Value Date    BILITOT 1.0 03/12/2025    CALCIUM 8.8 03/14/2025    CO2 26 03/14/2025     03/14/2025    CREATININE 0.73 03/14/2025    GLUCOSE 95 03/14/2025    ALKPHOS 60 03/12/2025    K 4.1 03/14/2025    PROT 8.3 (H) 03/12/2025     03/14/2025    AST 14 03/12/2025    ALT 10 03/12/2025    BUN 19 03/14/2025    ANIONGAP 10 03/14/2025    MG 2.09 03/14/2025    ALBUMIN 4.3 03/12/2025     No results found for: \"TRIG\", \"CHOL\", \"LDLCALC\", \"HDL\"  No results found for: \"HGBA1C\"      Current Outpatient Medications   Medication Instructions    acetaminophen (TYLENOL) 325 mg, As needed    acetylcysteine (MUCOMYST) 800 mg, nebulization, 2 times daily RT    ipratropium-albuteroL " (Duo-Neb) 0.5-2.5 mg/3 mL nebulizer solution 3 mL, nebulization, 3 times daily RT    magnesium oxide (Mag-Ox) 250 mg magnesium tablet Please take 1 tablet by mouth with breakfast, and again 1 tablet by mouth with supper.  Always with food please.  Thank you.    mometasone-formoterol (Dulera) 50-5 mcg/actuation HFA aerosol inhaler inhaler 1 puff    oxygen (O2) gas therapy 1 each, inhalation, Every 24 hours          Assessment/Plan   Problem List Items Addressed This Visit       COPD exacerbation (Multi)     -breathing + SOB improved since last week  -PCP appt today --> reports lungs sounded clear, also added mag ox 250 mg BID  -using 3L O2 at night only   -cough improved as well  -confirmed he is still taking Dulera --> does not want to start Trelegy d/t having paying $143 for 3 Dulera inhalers last week   -using duonebs and albuterol HFA PRN    O2 92% (room air)   /78  HR 81  Weight 150 lbs     Medications Changes/Concerns:  COPD   Current GDMT: Dulera 200-5 mcg/act (LABA/ICS maintenance) + albtuerol HFA PRN (rescue)   Discussed that since he paid $143 last week for 3 mos of Dulera he would like to add Incruse ($30/month) for right now and then will transition to Trelegy in 3 months   START Incruse Ellipta 62.5 mcg/act 1 puff once daily (LAMA maintenance)   Rescue kit delivered 3/26      Refills Needed:  -none needed today      Plan/To Do:  -has orders for lab work to complete per PCP   -send Incruse Ellipta rx to [facility]  -continue to check daily vitals and oxygen   -nursing will continue with daily calls     [facility] PAP Status:  -discussed he would not qualify based on joint income  -Trelegy would be $30 for 1 month       Time Spent with Patient and [facility] Team - Kareen Ash NP and TOREY Davis via phone call: 15 minutes     Follow Up: 1 week    Continue all meds under the continuation of care with the referring provider and clinical pharmacy team.    Celine Abbasi, PharmD     Verbal consent to manage patient's  drug therapy was obtained from the patient. They were informed they may decline to participate or withdraw from participation in pharmacy services at any time.

## 2025-03-27 NOTE — PROGRESS NOTES
[facility] Cammy Ash NP and Celine from Pharmacy. Patient states that he has been doing ok Patient had an appointment today and the provider states that he is improving and his lungs sounded good. His POX 92% this am on RA he is using 3l NC overnight. Patient has pending labs to get drawn. Reviewed patients Inhalers he is taking the dulera currently. He just ordered 3 months of dulera so he wants to wait until those are gone before changing to Trelegy. We will look at the cost of adding Spiriva/ incruse his insurance will cover the Incuse he is agreeable to a 90 days supply to go along with his we will send a script to [facility]  No other medication needs questions and concerns Addressed we will follow up with patient tomorrow to schedule next [facility] when his wife is available

## 2025-03-27 NOTE — PROGRESS NOTES
Subjective   Patient ID: Sindhu Salazar is a 79 y.o. male who presents for [facility] (Patient presented today to [facility]    HPI   New patient, here for primary care.  Follows regularly with PULMONOLOGY, stable with occasional OXYGEN supplementation, DULERA, MUCOMYST.  Currently, some paroxysms of coughing, clear sputum production.  CONSTITUTIONALLY, no fever, no chills.  No night sweats.  No lingering anorexia or nausea.  No apparent lymphadenopathy.  No apparent weight loss.    States that perhaps the last time he was seen by a PCP or similar was about 4 or 5 years ago.  During the visit, the patient almost unconsciously would look to the wife to answer questions.  The wife also pointed out that she was worried that his memory may be starting to show signs of challenge.  The patient is a retired high school .  No headache, blurred vision, no diplopia.  No dysphagia.  Does want to improve quality of life, and does not wish harm to self or others.  Concerned about his memory as well.  Seemingly no trouble with hearing.  The patient ambulates freely, but does use a walking stick from time to time.  No recent falls.    Lately, the patient started to experience some SHORTNESS OF BREATH perhaps more than usual, with some chills.  Otherwise, constitutionally, asymptomatic, as above.  No adeel substernal chest pain, no orthopnea, no paroxysmal nocturnal dyspnea.    Noted to have ANEMIA.  In the meantime, no gum or nose bleeding.  No easy bruisability.  No apparent blood in urine or stool.  Also, no particular dyspepsia, and no particular lightheadedness or dizziness to standing or movements.    No dysuria, flank, suprapubic pain.  Appetite seemingly preserved, no abdominal distress.  No particular skin changes.  The patient does want to improve quality of life, and does not wish harm to self or others.        Review of Systems  Review of systems as in history of present illness, and otherwise,  "reviewed separately as well, and was unremarkable/negative/noncontributory.        Objective   /78 (BP Location: Left arm, Patient Position: Sitting, BP Cuff Size: Adult)   Pulse 81   Ht 1.676 m (5' 6\")   Wt 68 kg (150 lb)   SpO2 (!) 85%   BMI 24.21 kg/m²     Physical Exam  In good spirits.  Not in distress or diaphoresis, speaking in short but complete sentences.  Receptive, oriented x 3, although sometimes, a little reluctant or perhaps slow to answer.  Continues to want to improve quality of life, and does not wish harm to self or others.  Moderately built.  Not cachectic.  Appropriate.  Independent, capable.  Walking stick by his side.    HEAD pink palpebral conjunctivae, anicteric sclerae.  Mucous membranes somewhat dry.  Otherwise, no tonsillopharyngeal congestion, no thrush.  No sinus, no tragal tenderness.  NECK supple, no apparent jugular venous distention.  No apparent lymphadenopathy.  CARDIOVASCULAR not in distress or diaphoresis.  No bipedal edema.  Seemingly regular rate and rhythm.  LUNGS not in distress or diaphoresis.  Not using accessory muscles.  Moderate air exchange at best.  No adventitious sounds noted.  ABDOMEN soft, nontender.  BACK no costovertebral angle tenderness.  EXTREMITIES no clubbing, no cyanosis.  NEURO no facial asymmetry.  No apparent cranial nerve deficits.  Romberg negative.  Ambulating without need of assistance, but also did bring single-point walking stick.  No apparent focal weakness.  No tremors.  PSYCH receptive, appropriate, and eager to maintain and improve quality of life.        LABORATORY results reviewed, with HYPERGLYCEMIA, GLUCOSURIA, preserved liver and kidney function noted.  ANEMIA noted as well.  Mild HYPONATREMIA, and low SERUM PROTEIN noted.  MAGNESIUM initially 1.56, replenished successfully.    Previous EKG noted, some PVCs noted, sinus.    CHEST x-ray, CT CAN suggestive of bilateral lower lobe pneumonia, emphysema.        Assessment/Plan "   Diagnoses and all orders for this visit:  COPD (chronic obstructive pulmonary disease) with chronic bronchitis (Multi)  -     CBC and Auto Differential; Future  -     Comprehensive Metabolic Panel; Future  -     Follow Up In Primary Care - Established; Future  Debility  -     CBC and Auto Differential; Future  -     Comprehensive Metabolic Panel; Future  -     TSH with reflex to Free T4 if abnormal; Future  -     Urinalysis with Reflex Microscopic; Future  -     Follow Up In Primary Care - Established; Future  Memory loss  -     CBC and Auto Differential; Future  -     Comprehensive Metabolic Panel; Future  -     TSH with reflex to Free T4 if abnormal; Future  -     Urinalysis with Reflex Microscopic; Future  -     Vitamin B12; Future  -     Folate; Future  -     Syphilis Screen with Reflex; Future  -     Follow Up In Primary Care - Established; Future  Ataxic gait  -     Vitamin B12; Future  -     Folate; Future  -     Follow Up In Primary Care - Established; Future  Anemia, normocytic normochromic  -     CBC and Auto Differential; Future  -     Urinalysis with Reflex Microscopic; Future  -     Vitamin B12; Future  -     Folate; Future  -     Ferritin; Future  -     Iron and TIBC; Future  -     Follow Up In Primary Care - Established; Future  Hyperglycemia  -     Comprehensive Metabolic Panel; Future  -     Urinalysis with Reflex Microscopic; Future  -     Hemoglobin A1C; Future  -     Follow Up In Primary Care - Established; Future  Glucosuria  -     Comprehensive Metabolic Panel; Future  -     Urinalysis with Reflex Microscopic; Future  -     Hemoglobin A1C; Future  -     Follow Up In Primary Care - Established; Future  Hypomagnesemia  -     Comprehensive Metabolic Panel; Future  -     magnesium oxide (Mag-Ox) 250 mg magnesium tablet; Please take 1 tablet by mouth with breakfast, and again 1 tablet by mouth with supper.  Always with food please.  Thank you.  -     Follow Up In Primary Care - Established;  Future  Blood pressure elevated without history of HTN  -     CBC and Auto Differential; Future  -     Comprehensive Metabolic Panel; Future  -     TSH with reflex to Free T4 if abnormal; Future  -     Urinalysis with Reflex Microscopic; Future  -     Follow Up In Primary Care - Established; Future  Vitamin D deficiency  -     Comprehensive Metabolic Panel; Future  -     Vitamin D 25-Hydroxy,Total (for eval of Vitamin D levels); Future  -     Follow Up In Primary Care - Established; Future  Seborrheic keratosis  -     CBC and Auto Differential; Future  -     Comprehensive Metabolic Panel; Future  -     Follow Up In Primary Care - Established; Future  Screening, lipid  -     Comprehensive Metabolic Panel; Future  -     Lipid Panel; Future  -     Follow Up In Primary Care - Established; Future       Thank you very much for coming.  It is a pleasure to meet you and your arnulfo wife!    I am glad that you are following with your LUNG SPECIALIST.  Continue with DULERA and with your MUCOMYST.  Continue using OXYGEN as needed.  I would recommend that you use oxygen at bedtime if it is available.    In the meantime, your lungs are relatively clear.  Let me repeat some blood and let us do some urine examinations to verify how you are doing.    With your history of LOW MAGNESIUM, let us place you on a magnesium supplement 250 mg.  Take this with breakfast and again with supper.  Always with food please.  Magnesium is good for your lungs, as well as your heart and your other muscles.    I am glad to see that you are using a CANE.  In the future, once you get stronger, we will determine if you will benefit from outpatient PHYSICAL THERAPY, especially with your history of back strain.    TYLENOL EXTRA STRENGTH 500 mg, take 2 tablets by mouth every 6 hours as needed for pain, up to 6 tablets in 24 hours.  I mention Tylenol because it is safe to take with or without food, and can be taken with your other medications safely.    I  do understand that you are concerned about your memory.  Let us have some BLOOD and URINE examinations done anytime soon.  You can have these done at any  facility convenient to you.    During this last evaluation, you were also noted to have low red cell count or ANEMIA.  Watch out for gum or nose bleeding.  Watch out for any blood in your urine or stool.  Let me know if you notice anything.  I will check some markers in your blood and urine as well.    Your blood pressure at 1 point was elevated, but now, it is good.  You do not need any medication for your blood pressure at this time.    Again, thank you very much for coming.  I do appreciate your visit!  Please make sure you are getting enough rest and sleep.  Please make sure you are eating sensibly.  Do not miss meals.  Please make sure that you are drinking enough fluids throughout the day.  You have to avoid dehydration.  Please stay physically active as much as you can tolerate.  Lots of walking will be good.  In the future, we will talk about physical therapy.    Please make sure that you have a MULTIVITAMIN like Centrum Silver.  Take this with BREAKFAST every day.    Again, thank you very much for coming.  I hope you are having a blessed CUARESMA and I do wish you Felices Pascuas!  Take care and God bless.  It was a pleasure to meet your wife, Sharmila, as well.            0  Return in 6 weeks.  40 minutes please.  Reassess debility, prioritize issues.  Coordinate with PULMONOLOGY.  Reassess mood, energy, function, cognition, consider physical therapy.  Rule out self-neglect, caregiver stress.  Review preventive strategies, shingles vaccination.  Reassess hearing.            0

## 2025-03-28 ENCOUNTER — HOME CARE VISIT (OUTPATIENT)
Dept: HOME HEALTH SERVICES | Facility: HOME HEALTH | Age: 80
End: 2025-03-28
Payer: MEDICARE

## 2025-03-28 PROCEDURE — G0157 HHC PT ASSISTANT EA 15: HCPCS | Mod: CQ,HHH

## 2025-03-28 ASSESSMENT — ENCOUNTER SYMPTOMS
LOWEST PAIN SEVERITY IN PAST 24 HOURS: 0/10
HIGHEST PAIN SEVERITY IN PAST 24 HOURS: 5/10
PERSON REPORTING PAIN: PATIENT
PAIN: 1
PAIN LOCATION: BACK
PAIN SEVERITY GOAL: 0/10

## 2025-03-31 ENCOUNTER — HOME CARE VISIT (OUTPATIENT)
Dept: HOME HEALTH SERVICES | Facility: HOME HEALTH | Age: 80
End: 2025-03-31
Payer: MEDICARE

## 2025-03-31 VITALS
TEMPERATURE: 97.4 F | DIASTOLIC BLOOD PRESSURE: 60 MMHG | RESPIRATION RATE: 18 BRPM | OXYGEN SATURATION: 95 % | HEART RATE: 78 BPM | SYSTOLIC BLOOD PRESSURE: 104 MMHG

## 2025-03-31 PROCEDURE — G0300 HHS/HOSPICE OF LPN EA 15 MIN: HCPCS | Mod: HHH

## 2025-03-31 ASSESSMENT — ENCOUNTER SYMPTOMS
LAST BOWEL MOVEMENT: 67295
DYSPNEA ACTIVITY LEVEL: AFTER AMBULATING MORE THAN 20 FT
STOOL FREQUENCY: DAILY
MUSCLE WEAKNESS: 1
APPETITE LEVEL: GOOD
BOWEL PATTERN NORMAL: 1
SHORTNESS OF BREATH: 1
CHANGE IN APPETITE: UNCHANGED

## 2025-03-31 ASSESSMENT — PAIN DESCRIPTION - PAIN TYPE: TYPE: ACUTE PAIN

## 2025-04-02 ENCOUNTER — PATIENT OUTREACH (OUTPATIENT)
Dept: PRIMARY CARE | Facility: CLINIC | Age: 80
End: 2025-04-02
Payer: MEDICARE

## 2025-04-02 ENCOUNTER — PATIENT OUTREACH (OUTPATIENT)
Dept: CARE COORDINATION | Age: 80
End: 2025-04-02
Payer: MEDICARE

## 2025-04-02 NOTE — PROGRESS NOTES
Last contact 3/27, LVM patient and LVM spouse.   Dis enrolled from [facility].

## 2025-04-02 NOTE — PROGRESS NOTES
Call regarding appt. with PCP on (3/27/25) after hospitalization.  At time of outreach call the patient feels as if their condition has (improved) since last visit. Patient reports he is doing pretty good and his appt went well.  Reviewed the PCP appointment with the pt and addressed any questions or concerns.

## 2025-04-02 NOTE — PROGRESS NOTES
[facility]  Follow Up Visit Note      HISTORY OF PRESENT ILLNESS     HISTORY OF PRESENT ILLNESS   Sindhu Salazar is a 80 y.o. male with a history of Prostate Cancer, Basal cell carcinoma of the face, and COPD, who is a former smoker (~75 pack years), who presents to a [facility] for a follow up evaluation for COPD.  Last visit by Dr. Delgado 12/12/2024 4/9/2025    Recently admitted 3/12-3/14 for PNA and COPD and has chronic hypoxic respiratory failure, former smoker former smoker (~75 pack years) presented to [facility] with chief complaints of cough and shortness of breath     On today's visit, the patient reports only wearing O2 during the night reports his COPD and coughing had been worsening over the past couple months. Admitted once in last 12 months.     C/o productive cough with yellow, previously light brown. Denies wheezing  He denies SOB at rest. He is ETIENNE with fast paced walking, MMR1  Denies chest tightness. Denies runny nose or PND.    Denies chest pain  Using Duoneb 3 times day  Does not have incruse, she is trying to finish dulera as has had 3 inhalers. They did not start incruse yet.   Using O2 at night  CAT 7      Current History Last visit by Dr. Delgado 12/12/2024  Mr. Salazar presented to the office today for follow-up.  No acute respiratory symptoms reported today.  We reviewed most recent pulmonary function test which revealed mild airflow obstruction with no significant change in spirometry post bronchodilators.  Diffusion capacity is moderately declined likely due to emphysema.  He is scheduled for a CT scan of chest later this month.  No interval history of acute COPD exacerbation.    Previous notes by Ami Delacruz, CNP  11/6/2024: Since last visit he reports productive cough on/off, having days with increased mucus and some days not as much.  Has been using Mucinex as needed.  He was prescribed NAC a  while back that he was not interested in taking after seeing side effects.  Continues Dulera 2 puffs twice daily.  Did not start Spiriva after last visit.  Using nebulizer once daily.  Last visit he complained of feeling his symptoms of shortness of breath were worsening.  Did get new PFTs that revealed moderate obstruction and reduced DLCO with air trapping, as well as glottic closure.    6MWT he started at 95% on room air.  He desaturated after 3 minutes and 30 seconds to 86% on room air.  After 2 L of supplemental oxygen he incremented to 95% on 2 L of oxygen.  CAT 19    6/19/24: Since last visit he reports using Dulera 2 puffs twice daily.  Rarely uses Combivent.  Uses Mucinex as needed states it was helping but he is afraid to overdo it.  Uses nebulizer once a day.  States he is coughing up yellow phlegm, wheezing and having dyspnea on exertion.  He feels his symptoms have worsened since December.  He has PFTs scheduled for 7/29/2024 he denies shortness of breath at rest, chest tightness, GERD, nighttime symptoms and ER visits for breathing issues.    12/14/23: Since last visit Mr. Salazar had a flare, needed antibiotic and prednisone.  He reports he feels much better and back to baseline.  Still having yellow to light brown mucus, occasional wheezing and having difficulty bringing up phlegm at times.  Has not been using his nebulizer.  Uses Dulera 2 puffs twice a day and rarely using Combivent.  He denies fevers, chills, chest pain and ER visits for breathing issues.  He is up-to-date on vaccines    5/19/23: Mr. Salazar was seeing pulmonology at [facility] [place], operated by [facility] and is switching his care over to . He is currently using Symbicort 2 puffs BID. He has 3 Dulera inhalers left at home that he is going to switch back to when finished with symbicort. He uses duo nebs twice daily and combivent when he is out, does not use often. Bronchitis 2 months ago requiring medrol dose pack and antibiotic feeling back to baseline. He reports  having a cold about a week ago and is feeling much better. He coughs occasionally with yellowish mucus. Occasional wheeze. ETIENNE with stairs. Denies SOB at rest, chest pain and ER visits for breathing issues.      Previous pulmonary history: COPD.     Inhalers/nebulized medications: Dulera, combivent and duo nebs     Hospitalization History: He has not been hospitalized over the last year for breathing related problem.     Sleep history: Denies snoring, apnea, feeling tired during the day or taking naps during the day.        REVIEW OF SYSTEMS     REVIEW OF SYSTEMS  Review of Systems    Constitutional: No fever, no chills, no night sweats.    Eyes: No double vision, no floaters, no dry eyes.   ENT: See HPI.   Neck: No neck stiffness.  Cardiovascular: No sharp chest pain, no heart racing, no leg swelling.  Respiratory: as noted in HPI.   Integumentary: No rashes or sores.  Neurological: No dizziness, no headaches. Sleeping well.  Psychiatric: No mood changes.       ALLERGIES AND MEDICATIONS     ALLERGIES  Allergies   Allergen Reactions    Iodinated Contrast Media Hives    Iodine Hives    Sulfa (Sulfonamide Antibiotics) Rash       MEDICATIONS  Current Outpatient Medications   Medication Sig Dispense Refill    acetaminophen (TylenoL) 325 mg capsule Take 1 capsule (325 mg) by mouth if needed.      acetylcysteine (Mucomyst) 200 mg/mL (20 %) nebulizer solution Take 4 mL (800 mg) by nebulization 2 times a day. 240 mL 6    ipratropium-albuteroL (Duo-Neb) 0.5-2.5 mg/3 mL nebulizer solution Take 3 mL by nebulization 3 times a day. 300 mL 0    magnesium oxide (Mag-Ox) 250 mg magnesium tablet Please take 1 tablet by mouth with breakfast, and again 1 tablet by mouth with supper.  Always with food please.  Thank you. 180 tablet 0    mometasone-formoterol (Dulera) 50-5 mcg/actuation HFA aerosol inhaler inhaler Inhale 1 puff.      oxygen (O2) gas therapy Inhale 1 each once every 24 hours.      umeclidinium (Incruse Ellipta) 62.5  mcg/actuation inhalation Inhale 1 puff (62.5 mcg) once daily. 90 each 0     No current facility-administered medications for this visit.         PAST HISTORY     PAST MEDICAL HISTORY  Prostate Cancer  Basal cell carcinoma of the face  COPD     PAST SURGICAL HISTORY  Past Surgical History:   Procedure Laterality Date    FINGER AMPUTATION      INNER EAR SURGERY      PROSTATE SURGERY         IMMUNIZATION HISTORY  Immunization History   Administered Date(s) Administered    Pfizer Gray Cap SARS-CoV-2 2022       SOCIAL HISTORY  Tobacco Smokin -45 y/o - 3 packs - quit in  - ~75 pack years   Illicit drugs: none  Alcohol consumption:  socially  Pets: none    OCCUPATIONAL/ENVIRONMENTAL HISTORY  Occupation: Previously worked as  for [employer]. House he used to live in had asbestos .     FAMILY HISTORY    No have a family history of pulmonary disease.  No have a family history of cancer.      PHYSICAL EXAM     VITAL SIGNS:   Vitals:    25 1552   BP: 124/81   Pulse: 86   Resp: 20   Temp: 36.8 °C (98.2 °F)   SpO2: 90%               PREVIOUS WEIGHTS: Body mass index is 23.92 kg/m².      Wt Readings from Last 3 Encounters:   25 67.2 kg (148 lb 3.2 oz)   25 68 kg (150 lb)   25 84.4 kg (186 lb)       Physical Exam    Constitutional: General appearance: Alert and oriented.  No acute distress. Well developed, well nourished.  Head and face: Symmetric  Pulmonary: Chest is normal. No increased work of breathing or signs of respiratory distress.  Prolonged exhalation, exp wheeze at the tale end of expiration, Clear to auscultation bilaterally - no crackles, wheezing, or rhonchi.   Cardiovascular: Heart rate and rhythm normal. Normal S1, S2   Extremities: No edema. No clubbing or cyanosis of the fingernails.    Neurologic: Moves all four extremities  Psychiatric: Intact judgement and insight.    RESULTS/DATA     Pulmonary Function Test Results                   Chest Radiograph     XR  CHEST 1 VIEW; 3/12/2025         FINDINGS:  The cardiac size is indeterminate in view of the AP projection.  Severe emphysematous change of the pulmonary parenchyma is present  bilaterally, most marked within the right upper lobe. Patchy  bibasilar infiltrate is present. Infiltrate appears new compared to  the prior CT. No effusions are identified. The aorta is tortuous.      IMPRESSION:  Patchy bibasilar infiltrate is present.  Severe COPD.          Chest CT Scan     CT ANGIO CHEST FOR PULMONARY EMBOLISM; 3/12/2025           TECHNIQUE:  75 ml of non-ionic iodinated contrast was injected intravenously.      CT angiography (non-coronary) of the chest was performed with  Intravenous contrast material along with 3D (maximum intensity  projection - MIP) image post processing.      One or more of the following dose reduction techniques were used:  Automated exposure control Adjustment of the mA and/or kV according  to patient size, and/or use of iterative reconstruction technique.      FINDINGS:  There is no evidence for aortic dissection or pericardial effusion.  There is no evidence for pulmonary embolism.      There is extensive emphysematous change of the pulmonary parenchyma  bilaterally. This is most severe within the upper lobes. There is  alveolar consolidation within the posterior segments of both right  lower lobe and left lower lobe. There is bandlike atelectasis within  the right middle lobe and lingula. No effusions are identified.      Chest Wall: No chest wall abnormalities are identified.      Upper Abdominal Findings: No pathologic findings are identified  involving the visualized portions of the upper abdomen.      Skeletal System: There is a 5.3 cm hiatal hernia. There is a left  upper quadrant accessory splenule.      IMPRESSION:  1. No evidence for pulmonary embolism.  2. Bilateral lower lobe areas of consolidation are identified  consistent with pneumonia. Follow-up to assure clearing  is  recommended.  3. Right middle lobe and lingular subsegmental atelectasis is present.  4. Severe emphysematous changes of the pulmonary parenchyma is  present.      Echocardiogram           ECHO 1/10/2020    Component 5 yr ago   Transcription       RIGHT VENTRICLE  The right ventricle is normal in size.  Right ventricular systolic function is normal. RV systolic tissue Doppler velocity   is 11.5 cm/s. Tricuspid annular displacement is 2.5 cm.  Estimated right ventricular systolic pressure is likely underestimated due to a  weak or incomplete tricuspid regurgitation signal and is, at least, 40 mmHg  consistent with mild pulmonary hypertension. Estimated right atrial pressure is 8  mmHg based on IVC assessment.    LEFT ATRIUM  The left atrial cavity is normal in size.  Pulmonary Veins:  The pulmonary venous pattern showed normal systolic flow.    RIGHT ATRIUM  The right atrial cavity is normal in size.  Inferior Vena Cava:  The inferior vena cava appears dilated measuring 2.4 cm. The vessel decreases  greater than 50 percent with inspiration.    MITRAL VALVE  The mitral valve leaflets are structurally normal. There is mild mitral annular  calcification observed posterior. There is no mitral stenosis. There is trace  mitral valve regurgitation. The pressure half time is 80 msec. The peak mitral E/A   ratio is 0.76. The average mitral E/e' ratio is 14.7. The mitral flow  deceleration time is 277 msec.    TRICUSPID VALVE  The tricuspid valve leaflets are structurally normal. There is no tricuspid  stenosis. There is trace tricuspid valve regurgitation.    AORTIC VALVE  There is no aortic valve stenosis. There is no aortic valve regurgitation.  Tricuspid aortic valve. There is mild thickening of the right, left and non  coronary aortic cusps at the tips. The peak gradient is 9 mmHg (peak velocity =  151.0 cm/s).    PULMONIC VALVE  The pulmonic valve cusps are structurally normal. There is no pulmonic stenosis.  There  "is trace pulmonic valve regurgitation. The peak gradient is 3 mmHg.    AORTA  The visualized aorta is normal in size.  Measurements - Sinus: 3.5 cm. Sinotubular junction 2.7 cm. Mid ascending aorta 3.0   cm.    PULMONARY ARTERIES  The pulmonary arteries are unseen or not interrogated.    INTERVENTRICULAR SEPTUM  The interventricular septum is normal.    PERICARDIUM  There is no pericardial effusion. There is an epicardial fat pad.    CONCLUSIONS:  - Technically difficult exam due to body habitus and respiratory interference,  poor acoustic windows.  - Exam indication: ETIENNE  - The left ventricle is normal in size. Left ventricular systolic function is  hyperdynamic. EF = 72 ± 5% (2D biplane) Normal left ventricular diastolic  function.  - The right ventricle is normal in size. Right ventricular systolic function is  normal.  - There are no significant valvular abnormalities.  - Technically difficult d/t body habitus, respiratory interference and poor  acoustic windows.  - The patient has not had a prior [facility] echocardiographic exam for comparison.            Labwork   Complete Blood Count  Lab Results   Component Value Date    WBC 10.6 03/14/2025    HGB 12.1 (L) 03/14/2025    HCT 36.0 (L) 03/14/2025    MCV 96 03/14/2025     03/14/2025       Peripheral Eosinophil Count/Percentage:   Eosinophils Absolute (x10*3/uL)   Date Value   03/12/2025 0.04     Eosinophils % (%)   Date Value   03/12/2025 0.4       Serum Immunoglobulin E:    No results found for: \"IGE\"       ASSESSMENT/PLAN     Mr. Salazar is a 80 y.o. male, with a history of Prostate Cancer, Basal cell carcinoma of the face, and COPD, who is a former smoker (~75 pack years), who presents to a [facility] for a follow up evaluation for COPD.    6MWT 11/6/24: Patient started at 95% on room air.  He desaturated after 3 minutes and 30 seconds to 86% on room air.  After 2 L of supplemental oxygen he incremented to 95% on 2 L of " oxygen.    Problem List and Orders  Problem List Items Addressed This Visit    None          Assessment and Plan / Recommendations:  Problem List Items Addressed This Visit    None       COPD (mild airflow obstruction) with moderate decline in DLCO - last 7/2024  - on dulera 50, will plan to expand coverage so stop   - stop incruse 1 puff a day  - start trelegy 100 1 puff a day   - Continue on current inhaler regimen  - Continue Combivent inhaler or ipratropium- albuterol nebulizer every 4-6 hours as needed  - cont nebulized mucomyst to help with expectoration.    Chronic hypoxic respiratory failure  - Wear 2 L of oxygen to maintain an SpO2 of 90% or greater with activity and sleep  - Follow up CT scan of chest results.  CT in March revealed bilateral lower lob consolidation of PNA RML atelectasis, severe emphysematous changes     Hypoxia: Presents to clinic today with O2 sat _90__% on room air. Placed on __84_L O2. Oxywalk completed..  _2__L O2 applied and  was _95___%. On 2L   Check nocturnal O2 on room air     Follow up in 6-8 weeks      If you have any questions please call the office [phone]

## 2025-04-03 ENCOUNTER — HOME CARE VISIT (OUTPATIENT)
Dept: HOME HEALTH SERVICES | Facility: HOME HEALTH | Age: 80
End: 2025-04-03
Payer: MEDICARE

## 2025-04-07 ENCOUNTER — HOME CARE VISIT (OUTPATIENT)
Dept: HOME HEALTH SERVICES | Facility: HOME HEALTH | Age: 80
End: 2025-04-07
Payer: MEDICARE

## 2025-04-07 VITALS
OXYGEN SATURATION: 92 % | HEART RATE: 92 BPM | DIASTOLIC BLOOD PRESSURE: 70 MMHG | TEMPERATURE: 97 F | SYSTOLIC BLOOD PRESSURE: 90 MMHG

## 2025-04-07 PROCEDURE — G0151 HHCP-SERV OF PT,EA 15 MIN: HCPCS | Mod: HHH

## 2025-04-07 ASSESSMENT — ENCOUNTER SYMPTOMS
LOWEST PAIN SEVERITY IN PAST 24 HOURS: 0/10
HIGHEST PAIN SEVERITY IN PAST 24 HOURS: 3/10
SUBJECTIVE PAIN PROGRESSION: RAPIDLY IMPROVING
PERSON REPORTING PAIN: PATIENT
PAIN LOCATION: BACK
PAIN: 1

## 2025-04-07 ASSESSMENT — ACTIVITIES OF DAILY LIVING (ADL)
AMBULATION_DISTANCE/DURATION_TOLERATED: >150 FT
AMBULATION ASSISTANCE ON FLAT SURFACES: 1
AMBULATION ASSISTANCE ON UNEVEN SURFACES: 1

## 2025-04-09 ENCOUNTER — APPOINTMENT (OUTPATIENT)
Facility: CLINIC | Age: 80
End: 2025-04-09
Payer: MEDICARE

## 2025-04-09 VITALS
HEART RATE: 86 BPM | TEMPERATURE: 98.2 F | HEIGHT: 66 IN | SYSTOLIC BLOOD PRESSURE: 124 MMHG | WEIGHT: 148.2 LBS | BODY MASS INDEX: 23.82 KG/M2 | RESPIRATION RATE: 20 BRPM | OXYGEN SATURATION: 90 % | DIASTOLIC BLOOD PRESSURE: 81 MMHG

## 2025-04-09 DIAGNOSIS — J96.11 CHRONIC RESPIRATORY FAILURE WITH HYPOXIA: ICD-10-CM

## 2025-04-09 DIAGNOSIS — J41.8 MIXED SIMPLE AND MUCOPURULENT CHRONIC BRONCHITIS (MULTI): Primary | ICD-10-CM

## 2025-04-09 DIAGNOSIS — J44.1 COPD EXACERBATION (MULTI): ICD-10-CM

## 2025-04-09 PROCEDURE — 1111F DSCHRG MED/CURRENT MED MERGE: CPT | Performed by: NURSE PRACTITIONER

## 2025-04-09 PROCEDURE — 1036F TOBACCO NON-USER: CPT | Performed by: NURSE PRACTITIONER

## 2025-04-09 PROCEDURE — 99214 OFFICE O/P EST MOD 30 MIN: CPT | Performed by: NURSE PRACTITIONER

## 2025-04-09 PROCEDURE — 1159F MED LIST DOCD IN RCRD: CPT | Performed by: NURSE PRACTITIONER

## 2025-04-09 ASSESSMENT — COPD QUESTIONNAIRES
QUESTION4_WALKINCLINE: 2
QUESTION8_ENERGYLEVEL: 1
QUESTION1_COUGHFREQUENCY: 1
QUESTION7_SLEEPQUALITY: 0 - I SLEEP SOUNDLY
QUESTION6_LEAVINGHOUSE: 1
QUESTION5_HOMEACTIVITIES: 1
CAT_TOTALSCORE: 7
QUESTION2_CHESTPHLEGM: 1
QUESTION3_CHESTTIGHTNESS: 0 - MY CHEST DOES NOT FEEL TIGHT AT ALL

## 2025-04-09 ASSESSMENT — ENCOUNTER SYMPTOMS
OCCASIONAL FEELINGS OF UNSTEADINESS: 0
DEPRESSION: 0
LOSS OF SENSATION IN FEET: 0

## 2025-04-09 ASSESSMENT — COLUMBIA-SUICIDE SEVERITY RATING SCALE - C-SSRS
1. IN THE PAST MONTH, HAVE YOU WISHED YOU WERE DEAD OR WISHED YOU COULD GO TO SLEEP AND NOT WAKE UP?: NO
2. HAVE YOU ACTUALLY HAD ANY THOUGHTS OF KILLING YOURSELF?: NO
6. HAVE YOU EVER DONE ANYTHING, STARTED TO DO ANYTHING, OR PREPARED TO DO ANYTHING TO END YOUR LIFE?: NO

## 2025-04-09 ASSESSMENT — PATIENT HEALTH QUESTIONNAIRE - PHQ9
1. LITTLE INTEREST OR PLEASURE IN DOING THINGS: NOT AT ALL
2. FEELING DOWN, DEPRESSED OR HOPELESS: NOT AT ALL
SUM OF ALL RESPONSES TO PHQ9 QUESTIONS 1 AND 2: 0

## 2025-04-09 NOTE — PATIENT INSTRUCTIONS
COPD (mild airflow obstruction) with moderate decline in DLCO - last 7/2024  - on dulera 50, will plan to expand coverage so stop   - stop incruse 1 puff a day  - start trelegy 100 1 puff a day   - Continue on current inhaler regimen  - Continue Combivent inhaler or ipratropium- albuterol nebulizer every 4-6 hours as needed  - Added nebulized mucomyst to help with expectoration.    Chronic hypoxic respiratory failure  - Wear 2 L of oxygen to maintain an SpO2 of 90% or greater with activity and sleep  - Follow up CT scan of chest results.  CT in March revealed bilateral lower lob consolidation of PNA RML atelectasis, severe emphysematous changes     Hypoxia: Presents to clinic today with O2 sat _90__% on room air. Placed on __84_L O2. Oxywalk completed..  _2__L O2 applied and  was _95___%. On 2L   Check nocturnal O2 on room air     Follow up in 6-8 weeks      If you have any questions please call the office [phone]

## 2025-04-10 ENCOUNTER — HOME CARE VISIT (OUTPATIENT)
Dept: HOME HEALTH SERVICES | Facility: HOME HEALTH | Age: 80
End: 2025-04-10
Payer: MEDICARE

## 2025-04-10 VITALS — RESPIRATION RATE: 18 BRPM | TEMPERATURE: 98.1 F

## 2025-04-10 PROCEDURE — G0300 HHS/HOSPICE OF LPN EA 15 MIN: HCPCS | Mod: HHH

## 2025-04-10 SDOH — HEALTH STABILITY: MENTAL HEALTH: SMOKING IN HOME: 0

## 2025-04-10 SDOH — ECONOMIC STABILITY: HOUSING INSECURITY: EVIDENCE OF SMOKING MATERIAL: 0

## 2025-04-10 ASSESSMENT — ENCOUNTER SYMPTOMS
DYSPNEA ACTIVITY LEVEL: AFTER AMBULATING MORE THAN 20 FT
BOWEL PATTERN NORMAL: 1
STOOL FREQUENCY: DAILY
SHORTNESS OF BREATH: 1
APPETITE LEVEL: GOOD
DYSPNEA ON EXERTION: 1
MUSCLE WEAKNESS: 1
FATIGUE: 1
LAST BOWEL MOVEMENT: 67305
FATIGUES EASILY: 1
CHANGE IN APPETITE: UNCHANGED

## 2025-04-10 ASSESSMENT — ACTIVITIES OF DAILY LIVING (ADL): MONEY MANAGEMENT (EXPENSES/BILLS): INDEPENDENT

## 2025-04-10 NOTE — HOME HEALTH
Patient seen today for routine, S and visit. Patient saw pulmonologist yesterday, he started a 28 day trial of Trelegy today. Reviewed usage instructions with patient and spouse, observed patient correctly administering medication. All VS are stable and WNL. Lung sounds clear throughout, diminished in B/L bases. POX 94% on RA at rest. Patient states he is currently using O2 at 2L only PRN and overnight with POX remaining between 92-94%. Spell states they have been instructed by pulmonologist to make an appointment to have an overnight oxygen level study done. Per patient, appetite remains good. He denies any pain or discomfort. Denies any issues moving bowels, or voiding.

## 2025-04-11 LAB
25(OH)D3+25(OH)D2 SERPL-MCNC: 22 NG/ML (ref 30–100)
ALBUMIN SERPL-MCNC: 4.1 G/DL (ref 3.6–5.1)
ALP SERPL-CCNC: 72 U/L (ref 35–144)
ALT SERPL-CCNC: 10 U/L (ref 9–46)
ANION GAP SERPL CALCULATED.4IONS-SCNC: 8 MMOL/L (CALC) (ref 7–17)
APPEARANCE UR: CLEAR
AST SERPL-CCNC: 15 U/L (ref 10–35)
BASOPHILS # BLD AUTO: 40 CELLS/UL (ref 0–200)
BASOPHILS NFR BLD AUTO: 1 %
BILIRUB SERPL-MCNC: 0.7 MG/DL (ref 0.2–1.2)
BILIRUB UR QL STRIP: NEGATIVE
BUN SERPL-MCNC: 21 MG/DL (ref 7–25)
CALCIUM SERPL-MCNC: 8.9 MG/DL (ref 8.6–10.3)
CHLORIDE SERPL-SCNC: 105 MMOL/L (ref 98–110)
CHOLEST SERPL-MCNC: 164 MG/DL
CHOLEST/HDLC SERPL: 3.3 (CALC)
CO2 SERPL-SCNC: 26 MMOL/L (ref 20–32)
COLOR UR: NORMAL
CREAT SERPL-MCNC: 0.92 MG/DL (ref 0.7–1.22)
EGFRCR SERPLBLD CKD-EPI 2021: 84 ML/MIN/1.73M2
EOSINOPHIL # BLD AUTO: 80 CELLS/UL (ref 15–500)
EOSINOPHIL NFR BLD AUTO: 2 %
ERYTHROCYTE [DISTWIDTH] IN BLOOD BY AUTOMATED COUNT: 12.4 % (ref 11–15)
EST. AVERAGE GLUCOSE BLD GHB EST-MCNC: 123 MG/DL
EST. AVERAGE GLUCOSE BLD GHB EST-SCNC: 6.8 MMOL/L
FERRITIN SERPL-MCNC: 104 NG/ML (ref 24–380)
FOLATE SERPL-MCNC: >24 NG/ML
GLUCOSE SERPL-MCNC: 83 MG/DL (ref 65–99)
GLUCOSE UR QL STRIP: NEGATIVE
HBA1C MFR BLD: 5.9 % OF TOTAL HGB
HCT VFR BLD AUTO: 44.1 % (ref 38.5–50)
HDLC SERPL-MCNC: 50 MG/DL
HGB BLD-MCNC: 14.5 G/DL (ref 13.2–17.1)
HGB UR QL STRIP: NEGATIVE
IRON SATN MFR SERPL: 29 % (CALC) (ref 20–48)
IRON SERPL-MCNC: 78 MCG/DL (ref 50–180)
KETONES UR QL STRIP: NEGATIVE
LDLC SERPL CALC-MCNC: 100 MG/DL (CALC)
LEUKOCYTE ESTERASE UR QL STRIP: NEGATIVE
LYMPHOCYTES # BLD AUTO: 1652 CELLS/UL (ref 850–3900)
LYMPHOCYTES NFR BLD AUTO: 41.3 %
MCH RBC QN AUTO: 31.8 PG (ref 27–33)
MCHC RBC AUTO-ENTMCNC: 32.9 G/DL (ref 32–36)
MCV RBC AUTO: 96.7 FL (ref 80–100)
MONOCYTES # BLD AUTO: 320 CELLS/UL (ref 200–950)
MONOCYTES NFR BLD AUTO: 8 %
NEUTROPHILS # BLD AUTO: 1908 CELLS/UL (ref 1500–7800)
NEUTROPHILS NFR BLD AUTO: 47.7 %
NITRITE UR QL STRIP: NEGATIVE
NONHDLC SERPL-MCNC: 114 MG/DL (CALC)
PH UR STRIP: 6 [PH] (ref 5–8)
PLATELET # BLD AUTO: 208 THOUSAND/UL (ref 140–400)
PMV BLD REES-ECKER: 9.7 FL (ref 7.5–12.5)
POTASSIUM SERPL-SCNC: 4.3 MMOL/L (ref 3.5–5.3)
PROT SERPL-MCNC: 7.6 G/DL (ref 6.1–8.1)
PROT UR QL STRIP: NEGATIVE
RBC # BLD AUTO: 4.56 MILLION/UL (ref 4.2–5.8)
SODIUM SERPL-SCNC: 139 MMOL/L (ref 135–146)
SP GR UR STRIP: 1.02 (ref 1–1.03)
T PALLIDUM AB SER QL IA: NEGATIVE
TIBC SERPL-MCNC: 270 MCG/DL (CALC) (ref 250–425)
TRIGL SERPL-MCNC: 46 MG/DL
TSH SERPL-ACNC: 1.4 MIU/L (ref 0.4–4.5)
VIT B12 SERPL-MCNC: 599 PG/ML (ref 200–1100)
WBC # BLD AUTO: 4 THOUSAND/UL (ref 3.8–10.8)

## 2025-04-17 ENCOUNTER — TELEPHONE (OUTPATIENT)
Facility: CLINIC | Age: 80
End: 2025-04-17
Payer: MEDICARE

## 2025-04-17 DIAGNOSIS — J44.1 COPD EXACERBATION (MULTI): ICD-10-CM

## 2025-04-17 DIAGNOSIS — J96.11 CHRONIC RESPIRATORY FAILURE WITH HYPOXIA: ICD-10-CM

## 2025-04-17 DIAGNOSIS — J41.8 MIXED SIMPLE AND MUCOPURULENT CHRONIC BRONCHITIS (MULTI): ICD-10-CM

## 2025-04-18 ENCOUNTER — HOME CARE VISIT (OUTPATIENT)
Dept: HOME HEALTH SERVICES | Facility: HOME HEALTH | Age: 80
End: 2025-04-18
Payer: MEDICARE

## 2025-04-24 ENCOUNTER — HOME CARE VISIT (OUTPATIENT)
Dept: HOME HEALTH SERVICES | Facility: HOME HEALTH | Age: 80
End: 2025-04-24
Payer: MEDICARE

## 2025-04-24 VITALS
DIASTOLIC BLOOD PRESSURE: 74 MMHG | HEART RATE: 79 BPM | TEMPERATURE: 98.6 F | SYSTOLIC BLOOD PRESSURE: 108 MMHG | OXYGEN SATURATION: 92 % | RESPIRATION RATE: 20 BRPM

## 2025-04-24 PROCEDURE — G0299 HHS/HOSPICE OF RN EA 15 MIN: HCPCS | Mod: HHH

## 2025-04-24 SDOH — HEALTH STABILITY: MENTAL HEALTH: SMOKING IN HOME: 0

## 2025-04-24 SDOH — ECONOMIC STABILITY: HOUSING INSECURITY: EVIDENCE OF SMOKING MATERIAL: 0

## 2025-04-24 ASSESSMENT — ACTIVITIES OF DAILY LIVING (ADL)
OASIS_M1830: 00
HOME_HEALTH_OASIS: 00

## 2025-04-24 ASSESSMENT — ENCOUNTER SYMPTOMS
PERSON REPORTING PAIN: PATIENT
DENIES PAIN: 1

## 2025-04-25 DIAGNOSIS — J44.89 COPD (CHRONIC OBSTRUCTIVE PULMONARY DISEASE) WITH CHRONIC BRONCHITIS (MULTI): Primary | ICD-10-CM

## 2025-05-01 ENCOUNTER — TELEPHONE (OUTPATIENT)
Facility: CLINIC | Age: 80
End: 2025-05-01
Payer: MEDICARE

## 2025-05-01 ENCOUNTER — DOCUMENTATION (OUTPATIENT)
Facility: CLINIC | Age: 80
End: 2025-05-01
Payer: MEDICARE

## 2025-05-01 DIAGNOSIS — G47.34 NOCTURNAL HYPOXIA: Primary | ICD-10-CM

## 2025-05-01 NOTE — TELEPHONE ENCOUNTER
----- Message from Leela Clinton sent at 5/1/2025  2:50 PM EDT -----  Regarding: nocturnal o2  Please send order for nocturnal oxygen to patient's DME and notify patient Leela

## 2025-05-01 NOTE — PROGRESS NOTES
[facility]  Follow Up Visit Note      HISTORY OF PRESENT ILLNESS     HISTORY OF PRESENT ILLNESS   Sindhu Salazar is a 80 y.o. male with a history of Prostate Cancer, Basal cell carcinoma of the face, and COPD, who is a former smoker (~75 pack years), who presents to a [facility] for a follow up evaluation for COPD.  Last visit by Dr. Delgado 12/12/2024 5/14/2025    Since last visit nocturnal pulse ox noted nocturnal hypoxia. 2L O2 ordered  On today's visit, the patient reports at home his pulse ox 90-92%. Reports he has not been wearing O2.  He reports feeling better. C/o productive cough with light yellow.  He is able to expectorate. Fast paced walking can cause dyspnea.  Denies chest tightness or fever or chills.    NO Er visits.   No Night time  symptoms   Have not used rescue inhaler  Using nebulizer a lot less - 1 -2 times a day   Using trelegy   Occ using aerobika    CAT 11     -----------  4/9/2025    Recently admitted 3/12-3/14 for PNA and COPD and has chronic hypoxic respiratory failure, former smoker former smoker (~75 pack years) presented to [facility] ER with chief complaints of cough and shortness of breath     On today's visit, the patient reports only wearing O2 during the night reports his COPD and coughing had been worsening over the past couple months. Admitted once in last 12 months.     C/o productive cough with yellow, previously light brown. Denies wheezing  He denies SOB at rest. He is ETIENNE with fast paced walking, MMR1  Denies chest tightness. Denies runny nose or PND.    Denies chest pain  Using Duoneb 3 times day  Does not have incruse, she is trying to finish dulera as has had 3 inhalers. They did not start incruse yet.   Using O2 at night  CAT 7      Current History Last visit by Dr. Delgado 12/12/2024  Mr. Salazar presented to the office today for follow-up.  No acute respiratory symptoms reported  today.  We reviewed most recent pulmonary function test which revealed mild airflow obstruction with no significant change in spirometry post bronchodilators.  Diffusion capacity is moderately declined likely due to emphysema.  He is scheduled for a CT scan of chest later this month.  No interval history of acute COPD exacerbation.    Previous notes by Ami Delacruz CNP  11/6/2024: Since last visit he reports productive cough on/off, having days with increased mucus and some days not as much.  Has been using Mucinex as needed.  He was prescribed NAC a while back that he was not interested in taking after seeing side effects.  Continues Dulera 2 puffs twice daily.  Did not start Spiriva after last visit.  Using nebulizer once daily.  Last visit he complained of feeling his symptoms of shortness of breath were worsening.  Did get new PFTs that revealed moderate obstruction and reduced DLCO with air trapping, as well as glottic closure.    6MWT he started at 95% on room air.  He desaturated after 3 minutes and 30 seconds to 86% on room air.  After 2 L of supplemental oxygen he incremented to 95% on 2 L of oxygen.  CAT 19    6/19/24: Since last visit he reports using Dulera 2 puffs twice daily.  Rarely uses Combivent.  Uses Mucinex as needed states it was helping but he is afraid to overdo it.  Uses nebulizer once a day.  States he is coughing up yellow phlegm, wheezing and having dyspnea on exertion.  He feels his symptoms have worsened since December.  He has PFTs scheduled for 7/29/2024 he denies shortness of breath at rest, chest tightness, GERD, nighttime symptoms and ER visits for breathing issues.    12/14/23: Since last visit Mr. Salazar had a flare, needed antibiotic and prednisone.  He reports he feels much better and back to baseline.  Still having yellow to light brown mucus, occasional wheezing and having difficulty bringing up phlegm at times.  Has not been using his nebulizer.  Uses Dulera 2 puffs twice a  day and rarely using Combivent.  He denies fevers, chills, chest pain and ER visits for breathing issues.  He is up-to-date on vaccines    5/19/23: Mr. Salazar was seeing pulmonology at [facility] and is switching his care over to . He is currently using Symbicort 2 puffs BID. He has 3 Dulera inhalers left at home that he is going to switch back to when finished with symbicort. He uses duo nebs twice daily and combivent when he is out, does not use often. Bronchitis 2 months ago requiring medrol dose pack and antibiotic feeling back to baseline. He reports having a cold about a week ago and is feeling much better. He coughs occasionally with yellowish mucus. Occasional wheeze. ETIENNE with stairs. Denies SOB at rest, chest pain and ER visits for breathing issues.      Previous pulmonary history: COPD.     Inhalers/nebulized medications: Dulera, combivent and duo nebs     Hospitalization History: He has not been hospitalized over the last year for breathing related problem.     Sleep history: Denies snoring, apnea, feeling tired during the day or taking naps during the day.        REVIEW OF SYSTEMS     REVIEW OF SYSTEMS  Review of Systems    Constitutional: No fever, no chills, no night sweats.    Eyes: No double vision, no floaters, no dry eyes.   ENT: See HPI.   Neck: No neck stiffness.  Cardiovascular: No sharp chest pain, no heart racing, no leg swelling.  Respiratory: as noted in HPI.   Integumentary: No rashes or sores.  Neurological: No dizziness, no headaches. Sleeping well.  Psychiatric: No mood changes.       ALLERGIES AND MEDICATIONS     ALLERGIES  Allergies   Allergen Reactions    Iodinated Contrast Media Hives    Iodine Hives    Sulfa (Sulfonamide Antibiotics) Rash       MEDICATIONS  Current Outpatient Medications   Medication Sig Dispense Refill    acetaminophen (TylenoL) 325 mg capsule Take 1 capsule (325 mg) by mouth if needed.      acetylcysteine (Mucomyst) 200 mg/mL (20 %) nebulizer solution Take 4 mL (800  "mg) by nebulization 2 times a day. 240 mL 6    fluticasone-umeclidin-vilanter (TRELEGY-ELLIPTA) 100-62.5-25 mcg blister with device Inhale 1 puff once daily. 1 each 3    ipratropium-albuteroL (Duo-Neb) 0.5-2.5 mg/3 mL nebulizer solution Take 3 mL by nebulization 3 times a day. 300 mL 0    magnesium oxide (Mag-Ox) 250 mg magnesium tablet Please take 1 tablet by mouth with breakfast, and again 1 tablet by mouth with supper.  Always with food please.  Thank you. 180 tablet 0    mometasone-formoterol (Dulera) 50-5 mcg/actuation HFA aerosol inhaler inhaler Inhale 1 puff.      oxygen (O2) gas therapy Inhale 2.5 L/min once daily as needed (shortness of breath). 1 each     umeclidinium (Incruse Ellipta) 62.5 mcg/actuation inhalation Inhale 1 puff (62.5 mcg) once daily. 90 each 0     No current facility-administered medications for this visit.         PAST HISTORY     PAST MEDICAL HISTORY  Prostate Cancer  Basal cell carcinoma of the face  COPD     PAST SURGICAL HISTORY  Past Surgical History:   Procedure Laterality Date    FINGER AMPUTATION      INNER EAR SURGERY      PROSTATE SURGERY         IMMUNIZATION HISTORY  Immunization History   Administered Date(s) Administered    Pfizer Gray Cap SARS-CoV-2 2022       SOCIAL HISTORY  Tobacco Smokin -47 y/o - 3 packs - quit in  - ~75 pack years   Illicit drugs: none  Alcohol consumption:  socially  Pets: none    OCCUPATIONAL/ENVIRONMENTAL HISTORY  Occupation: Previously worked as  for [employer]. House he used to live in had asbestos .     FAMILY HISTORY    No have a family history of pulmonary disease.  No have a family history of cancer.      PHYSICAL EXAM     VITAL SIGNS:   There were no vitals filed for this visit.    /85   Pulse 79   Temp 36.9 °C (98.4 °F)   Resp 18   Ht 1.676 m (5' 6\")   Wt 68.4 kg (150 lb 12.8 oz)   SpO2 90%   BMI 24.34 kg/m²      PREVIOUS WEIGHTS: There is no height or weight on file to calculate BMI.      Wt Readings " from Last 3 Encounters:   04/09/25 67.2 kg (148 lb 3.2 oz)   03/27/25 68 kg (150 lb)   03/17/25 84.4 kg (186 lb)       Physical Exam    Constitutional: General appearance: Alert and oriented.  No acute distress. Well developed, well nourished.  Head and face: Symmetric  Pulmonary: Chest is normal. No increased work of breathing or signs of respiratory distress.  Prolonged exhalation, exp wheeze at the tale end of expiration, Clear to auscultation bilaterally - no crackles, wheezing, or rhonchi.   Cardiovascular: Heart rate and rhythm normal. Normal S1, S2   Extremities: No edema. No clubbing or cyanosis of the fingernails.    Neurologic: Moves all four extremities  Psychiatric: Intact judgement and insight.    RESULTS/DATA     Pulmonary Function Test Results                   Chest Radiograph     XR CHEST 1 VIEW; 3/12/2025         FINDINGS:  The cardiac size is indeterminate in view of the AP projection.  Severe emphysematous change of the pulmonary parenchyma is present  bilaterally, most marked within the right upper lobe. Patchy  bibasilar infiltrate is present. Infiltrate appears new compared to  the prior CT. No effusions are identified. The aorta is tortuous.      IMPRESSION:  Patchy bibasilar infiltrate is present.  Severe COPD.          Chest CT Scan     CT ANGIO CHEST FOR PULMONARY EMBOLISM; 3/12/2025           TECHNIQUE:  75 ml of non-ionic iodinated contrast was injected intravenously.      CT angiography (non-coronary) of the chest was performed with  Intravenous contrast material along with 3D (maximum intensity  projection - MIP) image post processing.      One or more of the following dose reduction techniques were used:  Automated exposure control Adjustment of the mA and/or kV according  to patient size, and/or use of iterative reconstruction technique.      FINDINGS:  There is no evidence for aortic dissection or pericardial effusion.  There is no evidence for pulmonary embolism.      There is  extensive emphysematous change of the pulmonary parenchyma  bilaterally. This is most severe within the upper lobes. There is  alveolar consolidation within the posterior segments of both right  lower lobe and left lower lobe. There is bandlike atelectasis within  the right middle lobe and lingula. No effusions are identified.      Chest Wall: No chest wall abnormalities are identified.      Upper Abdominal Findings: No pathologic findings are identified  involving the visualized portions of the upper abdomen.      Skeletal System: There is a 5.3 cm hiatal hernia. There is a left  upper quadrant accessory splenule.      IMPRESSION:  1. No evidence for pulmonary embolism.  2. Bilateral lower lobe areas of consolidation are identified  consistent with pneumonia. Follow-up to assure clearing is  recommended.  3. Right middle lobe and lingular subsegmental atelectasis is present.  4. Severe emphysematous changes of the pulmonary parenchyma is  present.      Echocardiogram           ECHO 1/10/2020    Component 5 yr ago   Transcription       RIGHT VENTRICLE  The right ventricle is normal in size.  Right ventricular systolic function is normal. RV systolic tissue Doppler velocity   is 11.5 cm/s. Tricuspid annular displacement is 2.5 cm.  Estimated right ventricular systolic pressure is likely underestimated due to a  weak or incomplete tricuspid regurgitation signal and is, at least, 40 mmHg  consistent with mild pulmonary hypertension. Estimated right atrial pressure is 8  mmHg based on IVC assessment.    LEFT ATRIUM  The left atrial cavity is normal in size.  Pulmonary Veins:  The pulmonary venous pattern showed normal systolic flow.    RIGHT ATRIUM  The right atrial cavity is normal in size.  Inferior Vena Cava:  The inferior vena cava appears dilated measuring 2.4 cm. The vessel decreases  greater than 50 percent with inspiration.    MITRAL VALVE  The mitral valve leaflets are structurally normal. There is mild mitral  annular  calcification observed posterior. There is no mitral stenosis. There is trace  mitral valve regurgitation. The pressure half time is 80 msec. The peak mitral E/A   ratio is 0.76. The average mitral E/e' ratio is 14.7. The mitral flow  deceleration time is 277 msec.    TRICUSPID VALVE  The tricuspid valve leaflets are structurally normal. There is no tricuspid  stenosis. There is trace tricuspid valve regurgitation.    AORTIC VALVE  There is no aortic valve stenosis. There is no aortic valve regurgitation.  Tricuspid aortic valve. There is mild thickening of the right, left and non  coronary aortic cusps at the tips. The peak gradient is 9 mmHg (peak velocity =  151.0 cm/s).    PULMONIC VALVE  The pulmonic valve cusps are structurally normal. There is no pulmonic stenosis.  There is trace pulmonic valve regurgitation. The peak gradient is 3 mmHg.    AORTA  The visualized aorta is normal in size.  Measurements - Sinus: 3.5 cm. Sinotubular junction 2.7 cm. Mid ascending aorta 3.0   cm.    PULMONARY ARTERIES  The pulmonary arteries are unseen or not interrogated.    INTERVENTRICULAR SEPTUM  The interventricular septum is normal.    PERICARDIUM  There is no pericardial effusion. There is an epicardial fat pad.    CONCLUSIONS:  - Technically difficult exam due to body habitus and respiratory interference,  poor acoustic windows.  - Exam indication: ETIENNE  - The left ventricle is normal in size. Left ventricular systolic function is  hyperdynamic. EF = 72 ± 5% (2D biplane) Normal left ventricular diastolic  function.  - The right ventricle is normal in size. Right ventricular systolic function is  normal.  - There are no significant valvular abnormalities.  - Technically difficult d/t body habitus, respiratory interference and poor  acoustic windows.  - The patient has not had a prior CC echocardiographic exam for comparison.            Labwork   Complete Blood Count  Lab Results   Component Value Date    WBC 4.0  "04/09/2025    HGB 14.5 04/09/2025    HCT 44.1 04/09/2025    MCV 96.7 04/09/2025     04/09/2025       Peripheral Eosinophil Count/Percentage:   ABSOLUTE EOSINOPHILS (cells/uL)   Date Value   04/09/2025 80     EOSINOPHILS (%)   Date Value   04/09/2025 2.0       Serum Immunoglobulin E:    No results found for: \"IGE\"       ASSESSMENT/PLAN     Mr. Salazar is a 80 y.o. male, with a history of Prostate Cancer, Basal cell carcinoma of the face, and COPD, who is a former smoker (~75 pack years), who presents to a [facility] for a follow up evaluation for COPD.      Problem List and Orders  Problem List Items Addressed This Visit    None          Assessment and Plan / Recommendations:  Problem List Items Addressed This Visit    None       COPD (mild airflow obstruction) with moderate decline in DLCO - last 7/2024, repeat prior to next visit   - cont trelegy 100 1 puff a day   - Continue on current inhaler regimen  - Continue Combivent inhaler or ipratropium- albuterol nebulizer every 4-6 hours as needed  - cont nebulized mucomyst to help with expectoration.    Chronic hypoxic respiratory failure  - Wear 2 L of oxygen to maintain an SpO2 of 90% or greater with activity and sleep, nocturnal testing confirms hypoxia during sleep  CT in March revealed bilateral lower lob consolidation of PNA RML atelectasis, severe emphysematous changes       Follow up as scheduled with Dr Delgado     If you have any questions please call the office [phone]      "

## 2025-05-01 NOTE — PROGRESS NOTES
Patient had a nocturnal pulse oximetry study completed on April 30, 2025 which resulted in a pulse ox less than or equal to 88% for 44 minutes and 56 seconds therefore will order 2  liters of oxygen while sleeping

## 2025-05-02 ENCOUNTER — PATIENT OUTREACH (OUTPATIENT)
Dept: PRIMARY CARE | Facility: CLINIC | Age: 80
End: 2025-05-02
Payer: MEDICARE

## 2025-05-02 NOTE — PROGRESS NOTES
Successful outreach to patient regarding hospitalization as patient continues TCM program.   At time of outreach call the patient feels as if their condition has (improved) since initial visit with PCP or specialist. Pt reports his breathing is good and he's not feeling as bad as he was before; is awaiting results of his sleep study with pulmonology.  Questions or concerns addressed at this time with patient.   Provided contact information to patient if any further non-emergent needs arise.

## 2025-05-14 ENCOUNTER — APPOINTMENT (OUTPATIENT)
Facility: CLINIC | Age: 80
End: 2025-05-14
Payer: MEDICARE

## 2025-05-14 VITALS
HEART RATE: 79 BPM | HEIGHT: 66 IN | DIASTOLIC BLOOD PRESSURE: 85 MMHG | OXYGEN SATURATION: 90 % | TEMPERATURE: 98.4 F | BODY MASS INDEX: 24.23 KG/M2 | WEIGHT: 150.8 LBS | RESPIRATION RATE: 18 BRPM | SYSTOLIC BLOOD PRESSURE: 124 MMHG

## 2025-05-14 DIAGNOSIS — J44.1 COPD EXACERBATION (MULTI): ICD-10-CM

## 2025-05-14 DIAGNOSIS — J41.8 MIXED SIMPLE AND MUCOPURULENT CHRONIC BRONCHITIS (MULTI): ICD-10-CM

## 2025-05-14 DIAGNOSIS — J96.11 CHRONIC RESPIRATORY FAILURE WITH HYPOXIA: ICD-10-CM

## 2025-05-14 PROCEDURE — 99214 OFFICE O/P EST MOD 30 MIN: CPT | Performed by: NURSE PRACTITIONER

## 2025-05-14 PROCEDURE — 1160F RVW MEDS BY RX/DR IN RCRD: CPT | Performed by: NURSE PRACTITIONER

## 2025-05-14 PROCEDURE — 1036F TOBACCO NON-USER: CPT | Performed by: NURSE PRACTITIONER

## 2025-05-14 PROCEDURE — 1159F MED LIST DOCD IN RCRD: CPT | Performed by: NURSE PRACTITIONER

## 2025-05-14 RX ORDER — IPRATROPIUM BROMIDE AND ALBUTEROL SULFATE 2.5; .5 MG/3ML; MG/3ML
3 SOLUTION RESPIRATORY (INHALATION)
Qty: 300 ML | Refills: 0 | Status: SHIPPED | OUTPATIENT
Start: 2025-05-14 | End: 2025-05-15

## 2025-05-14 RX ORDER — ALBUTEROL SULFATE 90 UG/1
1 INHALANT RESPIRATORY (INHALATION) ONCE
OUTPATIENT
Start: 2025-05-14

## 2025-05-14 RX ORDER — ALBUTEROL SULFATE 0.83 MG/ML
3 SOLUTION RESPIRATORY (INHALATION) ONCE
OUTPATIENT
Start: 2025-05-14 | End: 2025-05-14

## 2025-05-14 ASSESSMENT — COPD QUESTIONNAIRES
QUESTION2_CHESTPHLEGM: 1
QUESTION6_LEAVINGHOUSE: 1
QUESTION8_ENERGYLEVEL: 2
QUESTION3_CHESTTIGHTNESS: 0 - MY CHEST DOES NOT FEEL TIGHT AT ALL
QUESTION4_WALKINCLINE: 3
QUESTION7_SLEEPQUALITY: 0 - I SLEEP SOUNDLY
QUESTION5_HOMEACTIVITIES: 2
CAT_TOTALSCORE: 11
QUESTION1_COUGHFREQUENCY: 2

## 2025-05-14 ASSESSMENT — PATIENT HEALTH QUESTIONNAIRE - PHQ9
SUM OF ALL RESPONSES TO PHQ9 QUESTIONS 1 AND 2: 0
1. LITTLE INTEREST OR PLEASURE IN DOING THINGS: NOT AT ALL
2. FEELING DOWN, DEPRESSED OR HOPELESS: NOT AT ALL

## 2025-05-14 ASSESSMENT — COLUMBIA-SUICIDE SEVERITY RATING SCALE - C-SSRS
1. IN THE PAST MONTH, HAVE YOU WISHED YOU WERE DEAD OR WISHED YOU COULD GO TO SLEEP AND NOT WAKE UP?: NO
6. HAVE YOU EVER DONE ANYTHING, STARTED TO DO ANYTHING, OR PREPARED TO DO ANYTHING TO END YOUR LIFE?: NO
2. HAVE YOU ACTUALLY HAD ANY THOUGHTS OF KILLING YOURSELF?: NO

## 2025-05-14 NOTE — PATIENT INSTRUCTIONS
COPD (mild airflow obstruction) with moderate decline in DLCO - last 7/2024, repeat prior to next visit   - cont trelegy 100 1 puff a day   - Continue on current inhaler regimen  - Continue Combivent inhaler or ipratropium- albuterol nebulizer every 4-6 hours as needed  - cont nebulized mucomyst to help with expectoration.    Chronic hypoxic respiratory failure  - Wear 2 L of oxygen to maintain an SpO2 of 90% or greater with activity and sleep, nocturnal testing confirms hypoxia during sleep  CT in March revealed bilateral lower lob consolidation of PNA RML atelectasis, severe emphysematous changes       Follow up as scheduled with Dr Delgado     If you have any questions please call the office [phone]

## 2025-05-15 RX ORDER — IPRATROPIUM BROMIDE 0.5 MG/2.5ML
0.5 SOLUTION RESPIRATORY (INHALATION) 4 TIMES DAILY
Qty: 300 ML | Refills: 3 | Status: SHIPPED | OUTPATIENT
Start: 2025-05-15 | End: 2025-06-14

## 2025-05-16 ENCOUNTER — APPOINTMENT (OUTPATIENT)
Dept: PRIMARY CARE | Facility: CLINIC | Age: 80
End: 2025-05-16
Payer: MEDICARE

## 2025-05-16 VITALS
BODY MASS INDEX: 23.95 KG/M2 | SYSTOLIC BLOOD PRESSURE: 115 MMHG | WEIGHT: 149 LBS | HEIGHT: 66 IN | HEART RATE: 72 BPM | DIASTOLIC BLOOD PRESSURE: 78 MMHG | OXYGEN SATURATION: 90 %

## 2025-05-16 DIAGNOSIS — R41.3 MEMORY LOSS: ICD-10-CM

## 2025-05-16 DIAGNOSIS — R81 GLUCOSURIA: ICD-10-CM

## 2025-05-16 DIAGNOSIS — J44.89 COPD (CHRONIC OBSTRUCTIVE PULMONARY DISEASE) WITH CHRONIC BRONCHITIS (MULTI): ICD-10-CM

## 2025-05-16 DIAGNOSIS — Z71.89 CARDIAC RISK COUNSELING: ICD-10-CM

## 2025-05-16 DIAGNOSIS — N39.41 URGE INCONTINENCE OF URINE: ICD-10-CM

## 2025-05-16 DIAGNOSIS — H90.3 SENSORINEURAL HEARING LOSS (SNHL) OF BOTH EARS: Chronic | ICD-10-CM

## 2025-05-16 DIAGNOSIS — E55.9 VITAMIN D DEFICIENCY: ICD-10-CM

## 2025-05-16 DIAGNOSIS — L82.1 SEBORRHEIC KERATOSIS: ICD-10-CM

## 2025-05-16 DIAGNOSIS — R03.0 BLOOD PRESSURE ELEVATED WITHOUT HISTORY OF HTN: ICD-10-CM

## 2025-05-16 DIAGNOSIS — R73.9 HYPERGLYCEMIA: ICD-10-CM

## 2025-05-16 DIAGNOSIS — Z00.00 ROUTINE GENERAL MEDICAL EXAMINATION AT HEALTH CARE FACILITY: Primary | ICD-10-CM

## 2025-05-16 DIAGNOSIS — R26.0 ATAXIC GAIT: ICD-10-CM

## 2025-05-16 DIAGNOSIS — E78.00 PURE HYPERCHOLESTEROLEMIA WITH TARGET LOW DENSITY LIPOPROTEIN (LDL) CHOLESTEROL LESS THAN 160 MG/DL: ICD-10-CM

## 2025-05-16 DIAGNOSIS — E83.42 HYPOMAGNESEMIA: ICD-10-CM

## 2025-05-16 DIAGNOSIS — R53.81 DEBILITY: ICD-10-CM

## 2025-05-16 RX ORDER — SERTRALINE HYDROCHLORIDE 25 MG/1
TABLET, FILM COATED ORAL
Qty: 90 TABLET | Refills: 1 | Status: SHIPPED | OUTPATIENT
Start: 2025-05-16

## 2025-05-16 RX ORDER — ASPIRIN 325 MG
TABLET, DELAYED RELEASE (ENTERIC COATED) ORAL
Qty: 13 CAPSULE | Refills: 1 | Status: SHIPPED | OUTPATIENT
Start: 2025-05-16

## 2025-05-16 ASSESSMENT — ACTIVITIES OF DAILY LIVING (ADL)
GROCERY_SHOPPING: INDEPENDENT
DOING_HOUSEWORK: NEEDS ASSISTANCE
MANAGING_FINANCES: NEEDS ASSISTANCE
TAKING_MEDICATION: NEEDS ASSISTANCE
DRESSING: INDEPENDENT
BATHING: INDEPENDENT

## 2025-05-16 ASSESSMENT — PATIENT HEALTH QUESTIONNAIRE - PHQ9
2. FEELING DOWN, DEPRESSED OR HOPELESS: NOT AT ALL
SUM OF ALL RESPONSES TO PHQ9 QUESTIONS 1 AND 2: 0
SUM OF ALL RESPONSES TO PHQ9 QUESTIONS 1 AND 2: 0
1. LITTLE INTEREST OR PLEASURE IN DOING THINGS: NOT AT ALL
1. LITTLE INTEREST OR PLEASURE IN DOING THINGS: NOT AT ALL
2. FEELING DOWN, DEPRESSED OR HOPELESS: NOT AT ALL

## 2025-05-16 ASSESSMENT — ENCOUNTER SYMPTOMS
OCCASIONAL FEELINGS OF UNSTEADINESS: 0
DEPRESSION: 0
LOSS OF SENSATION IN FEET: 0

## 2025-05-16 NOTE — PATIENT INSTRUCTIONS
Thank you very much for coming.  I am very happy to see you again.  I do appreciate your patience and your trust.    Thank you for doing your FASTING laboratory examinations.  Your CHOLESTEROL panel is good, you do not need any cholesterol medication at this time.  You do have a history of low red cell count, but that is not apparent.  You have no anemia.  Your marker for sugar is a little high, but you do not need to take medicine for this.  You just have to eat perhaps a little bit more sensibly, and stay physically active.    Your vitamin D is a little low.  You will benefit from vitamin D supplementation.  Please take vitamin D3 50,000 unit capsule, take this once a week with food and a glass of water.  Same time and day each week please.    I do understand your concerns regarding your MEMORY.  I checked the usual markers, B12, folic acid, thyroid function, kidney and liver function, even the syphilis marker.  All of these came back normal.    The next best step would be to put you on a very low-dose of a antidepressant, not because you are depressed, but because it will protect your memory.  SERTRALINE is the safest regimen for this.  Take 25 mg tablet with SUPPER every evening.  This medicine may cause blurred vision, dry mouth, urine hesitation, but at a low dose, you should have no trouble taking this medication.  Take with supper, just in case it makes you sleepy.    Also, to help preserve and protect your memory, it will be a good idea for you to have a small notebook and a pen handy.  Write down things that you want to remember.  This helps in at least 2 ways.  If you forget, you can always check your notebook, that is the first way it helps.  SECONDLY, the fact that you wrote something down freeze your mind of worry, and this in turn actually helps you remember better!  THIRDLY, the act of writing things down also strengthened your memory.  Get a small notebook and pen and keep these handy.    Also, I am  glad to hear that you are not using a HEARING AID on your good ear, the left ear.  Remember, protect your hearing, because it contributes to protecting your memory and INDEPENDENCE!    You do have a tendency to have to rush to the toilet to urinate.  I am asking you to drink more fluid, which means you might have to go to the bathroom more often.  Go to the bathroom before you have to go to the bathroom, so that you can minimize any accidents.    Please continue following with your LUNG doctor and his associates.  Your lungs are clear at the moment.  Of course, if you have any colored phlegm with persistence, yellow or green, or if you have fever 101 °F or higher, please let me know.    Please continue to stay physically active and use your cane as needed.  If you would like to do PHYSICAL THERAPY, please let me know.    Again, thank you very much for coming.  I do appreciate your visit, and your trust.  Please let me see you again in 6 months.  Please do FASTING laboratory examinations a few days prior, then see me soon after for your yearly PHYSICAL examination.    Until then, please get yourself vaccinated for SHINGLES.  SHINGRIX is the vaccination, and it comes in a series of 2 injections at least 2 months apart.  Highly recommended, to be paid for by your insurance, and available from your local friendly pharmacy.  Please get this done.    Again, thank you very much for coming.  Please continue to take care of yourself and each other, and please continue to pray for our recovery from this pandemic.  I hope you had a good Easter Sunday, and I do wish you a happy summer!  See you in November.    We reviewed your living well wishes and your CODE STATUS, and we will assign your DAUGHTER as your DURABLE POWER OF  for healthcare matters.  This means that if anything were to happen to you, and if you are unable to express your wishes, she will be the person whom we will contact, and she can speak on your  behalf and make medical decisions for you.  Your WIFE will be the alternate and will be the immediate second person that we will contact.    Your CODE STATUS is FULL CODE, meaning that we will do everything possible to save your life and to preserve it.  Afterwards, when you are stable, we will discuss with you and your family how you are doing and what your options are.    At the very least, we will try our best to maintain your quality of life and keep you comfortable.  Thank you for discussing this with me.            0  Return in 6 months.  40 minutes please.  Repeat FASTING laboratory examination, then see me for Medicare Wellness evaluation.  Reassess hemodynamics, cardiovascular risk, preventive strategies, mood, energy, function, cognition.  Coordinate with pulmonary medicine, offer physical therapy once more.  Watch out for debility, caregiver stress.  Prepare for winter.            0

## 2025-05-16 NOTE — PROGRESS NOTES
Subjective   Reason for Visit: Sindhu Salazar is an 80 y.o. male here for a Medicare Wellness visit.     Past Medical, Surgical, and Family History reviewed and updated in chart.    Reviewed all medications by prescribing practitioner or clinical pharmacist (such as prescriptions, OTCs, herbal therapies and supplements) and documented in the medical record.    HPI  The patient follows closely with PULMONARY MEDICINE, and was seen lately, found to be doing well.  Encouraged to please use oxygen per nasal cannula at bedtime, and of course as needed during the daytime when short of breath.  Exacerbation of cough recently, able to bring of mainly clear or whitish phlegm, sometimes with yellow discharge, but not persistent.  No accompanying chest pain, no palpitations, no orthopnea, no paroxysmal nocturnal dyspnea.  CONSTITUTIONALLY, no fever, no chills.  No night sweats.  No lingering anorexia or nausea.  No apparent lymphadenopathy.  No apparent weight loss.    The patient does have some URGE INCONTINENCE, but he makes up for this by perhaps decreasing fluid intake, and going to the bathroom before he gets any urgency.  He does understand that drinking more fluids is actually good for him, and he states that he can probably cope by going to the bathroom sooner.  Otherwise, no dysuria, flank, suprapubic pain.    Wife states that the patient does forget things, and does ask the same questions sometimes repeatedly.  In the meantime, the patient is not frustrated by this, and does want to improve quality of life, and does not wish harm to self or others.  He does have a history of HEARING LOSS, with the right ear completely deaf, and the left ear benefiting from a HEARING AID.  He does understand that protecting his hearing will protect his memory and independence.    He does point out that he probably could drink more fluids.  He seems to be eating sensibly, although he does enjoy a fatty meal from time to time.  Appetite  "preserved, no abdominal distress.  No particular skin changes, no jaundice.  ENDOCRINE with no polyuria, polydipsia, polyphagia.  No blurred vision.  No skin, hair, nail changes.  No dramatic weight loss or weight gain.      The patient ambulates with the use of a walking stick, and states that right now, he does not feel the need to do any physical therapy, but promises to let me know.  Currently, no headache, blurred vision, no diplopia.  No dysphagia.  No recent falls.      Medicare Wellness evaluation, living will wishes, CODE STATUS, all reviewed today as well, please see.    Patient Care Team:  Viktor Cagle MD as PCP - General (Internal Medicine)  MEGAN Sparks-CNP as PCP - [employer]age PCP  Marely Webster LPN as Care Manager (Case Management)         Review of Systems  Review of systems as in history of present illness, and otherwise, reviewed separately as well, and was unremarkable/negative/noncontributory.        Objective   Vitals:  /78 (BP Location: Left arm, Patient Position: Sitting, BP Cuff Size: Adult)   Pulse 72   Ht 1.676 m (5' 6\")   Wt 67.6 kg (149 lb)   SpO2 90%   BMI 24.05 kg/m²       Physical Exam  In good spirits.  Not in distress or diaphoresis.  Receptive, oriented x 3.  Amiable.  Not unkempt.  Perhaps minimally hard of hearing, but coping well.  Perhaps sometimes a little slow to answer, but remaining engaged.  Does want to improve quality of life, and does not wish harm to self or others.  Moderately built, not cachectic.  Not unkempt.  Appropriate.    HEAD pink palpebral conjunctivae, anicteric sclerae.  Mucous membranes moist.  No tonsillopharyngeal congestion, no thrush.  No sinus or tragal tenderness.  NECK supple, no apparent jugular venous distention.  No lymphadenopathy.  CARDIOVASCULAR not in distress or diaphoresis.  No bipedal edema.  Regular rate and rhythm.  No murmurs appreciated.  LUNGS not in distress or diaphoresis.  Not using " accessory muscles.  Moderate air exchange.  Currently, clear to auscultation bilaterally.  ABDOMEN soft, nontender.  BACK no costovertebral angle tenderness.  EXTREMITIES no clubbing, no cyanosis.  NEURO no facial asymmetry.  No apparent cranial nerve deficits.  Romberg negative.  Ambulating with the use of a single-point walking stick. No apparent focal weakness.  No tremors.  PSYCH receptive, appropriate, and eager to maintain and improve quality of life.        LABORATORY results noted, negative workup for memory, B12, folic acid, TSH, RPR, all coming back negative.  LDL cholesterol 100.  Hemodynamically stable.  Liver function preserved.  Hemoglobin A1c 5.9, body mass index 24.05.  Ferritin 104, no anemia.  Vitamin D 22.        ASSESSMENT/Plans:  Sindhu was seen today for medicare annual wellness visit subsequent.  Diagnoses and all orders for this visit:  Routine general medical examination at health care facility (Primary)  -     1 Year Follow Up In Primary Care - Wellness Exam; Future  -     Follow Up In Primary Care - Established; Future  COPD (chronic obstructive pulmonary disease) with chronic bronchitis (Multi)  -     Follow Up In Primary Care - [facility]  -     Follow Up In Primary Care - [facility]; Future  -     CBC and Auto Differential; Future  -     Magnesium; Future  Debility  -     Follow Up In Primary Care - Established; Future  -     CBC and Auto Differential; Future  -     Comprehensive Metabolic Panel; Future  -     TSH with reflex to Free T4 if abnormal; Future  -     Magnesium; Future  -     Urinalysis with Reflex Culture and Microscopic; Future  -     sertraline (Zoloft) 25 mg tablet; Please take 1 tablet by mouth with SUPPER every evening.  Thank you.  Ataxic gait  -     Follow Up In Primary Care - Established; Future  -     Comprehensive Metabolic Panel; Future  -     Urinalysis with Reflex Culture and Microscopic; Future  -     sertraline (Zoloft) 25 mg tablet; Please take 1 tablet by  mouth with SUPPER every evening.  Thank you.  Vitamin D deficiency  -     Follow Up In Primary Care - [facility]; Future  -     Comprehensive Metabolic Panel; Future  -     Vitamin D 25-Hydroxy,Total (for eval of Vitamin D levels); Future  -     cholecalciferol (Vitamin D-3) 1.25 mg (50,000 units) capsule; Please take 1 capsule by mouth with food once a week only.  Same time and day please each week.  Lots of fluids throughout the day.  Thank you.  Memory loss  -     Follow Up In Primary Care - [facility]; Future  -     Comprehensive Metabolic Panel; Future  -     TSH with reflex to Free T4 if abnormal; Future  -     Urinalysis with Reflex Culture and Microscopic; Future  -     sertraline (Zoloft) 25 mg tablet; Please take 1 tablet by mouth with SUPPER every evening.  Thank you.  Glucosuria  -     Follow Up In Primary Care - [facility]; Future  -     Urinalysis with Reflex Culture and Microscopic; Future  Hyperglycemia  -     Follow Up In Primary Care - Established; Future  -     Comprehensive Metabolic Panel; Future  -     Urinalysis with Reflex Culture and Microscopic; Future  -     Hemoglobin A1C; Future  Blood pressure elevated without history of HTN  -     Follow Up In Primary Care - Established; Future  -     CBC and Auto Differential; Future  -     Comprehensive Metabolic Panel; Future  -     TSH with reflex to Free T4 if abnormal; Future  -     Magnesium; Future  -     Urinalysis with Reflex Culture and Microscopic; Future  Pure hypercholesterolemia with target low density lipoprotein (LDL) cholesterol less than 160 mg/dL  -     Follow Up In Primary Care [facility]; Future  -     Comprehensive Metabolic Panel; Future  -     Lipid Panel; Future  Cardiac risk counseling  -     Follow Up In Primary Care - Established; Future  -     Comprehensive Metabolic Panel; Future  -     Hemoglobin A1C; Future  -     Lipid Panel; Future  Urge incontinence of urine  -     Follow Up In [facility];  Future  -     Urinalysis with Reflex Culture and Microscopic; Future  Hypomagnesemia  -     Follow Up In Primary Care - Established; Future  -     Magnesium; Future  Seborrheic keratosis  -     Follow Up In Primary Care - Established; Future  -     CBC and Auto Differential; Future  Sensorineural hearing loss (SNHL) of both ears  Comments:  Deaf, right, hearing aid, left  Orders:  -     Follow Up In Primary Care - Established; Future  -     Comprehensive Metabolic Panel; Future  -     TSH with reflex to Free T4 if abnormal; Future      Thank you very much for coming.  I am very happy to see you again.  I do appreciate your patience and your trust.    Thank you for doing your FASTING laboratory examinations.  Your CHOLESTEROL panel is good, you do not need any cholesterol medication at this time.  You do have a history of low red cell count, but that is not apparent.  You have no anemia.  Your marker for sugar is a little high, but you do not need to take medicine for this.  You just have to eat perhaps a little bit more sensibly, and stay physically active.    Your vitamin D is a little low.  You will benefit from vitamin D supplementation.  Please take vitamin D3 50,000 unit capsule, take this once a week with food and a glass of water.  Same time and day each week please.    I do understand your concerns regarding your MEMORY.  I checked the usual markers, B12, folic acid, thyroid function, kidney and liver function, even the syphilis marker.  All of these came back normal.    The next best step would be to put you on a very low-dose of a antidepressant, not because you are depressed, but because it will protect your memory.  SERTRALINE is the safest regimen for this.  Take 25 mg tablet with SUPPER every evening.  This medicine may cause blurred vision, dry mouth, urine hesitation, but at a low dose, you should have no trouble taking this medication.  Take with supper, just in case it makes you sleepy.    Also, to  help preserve and protect your memory, it will be a good idea for you to have a small notebook and a pen handy.  Write down things that you want to remember.  This helps in at least 2 ways.  If you forget, you can always check your notebook, that is the first way it helps.  SECONDLY, the fact that you wrote something down freeze your mind of worry, and this in turn actually helps you remember better!  THIRDLY, the act of writing things down also strengthened your memory.  Get a small notebook and pen and keep these handy.    Also, I am glad to hear that you are not using a HEARING AID on your good ear, the left ear.  Remember, protect your hearing, because it contributes to protecting your memory and INDEPENDENCE!    You do have a tendency to have to rush to the toilet to urinate.  I am asking you to drink more fluid, which means you might have to go to the bathroom more often.  Go to the bathroom before you have to go to the bathroom, so that you can minimize any accidents.    Please continue following with your LUNG doctor and his associates.  Your lungs are clear at the moment.  Of course, if you have any colored phlegm with persistence, yellow or green, or if you have fever 101 °F or higher, please let me know.    Please continue to stay physically active and use your cane as needed.  If you would like to do PHYSICAL THERAPY, please let me know.    Again, thank you very much for coming.  I do appreciate your visit, and your trust.  Please let me see you again in 6 months.  Please do FASTING laboratory examinations a few days prior, then see me soon after for your yearly PHYSICAL examination.    Until then, please get yourself vaccinated for SHINGLES.  SHINGRIX is the vaccination, and it comes in a series of 2 injections at least 2 months apart.  Highly recommended, to be paid for by your insurance, and available from your local friendly pharmacy.  Please get this done.    Again, thank you very much for coming.   Please continue to take care of yourself and each other, and please continue to pray for our recovery from this pandemic.  I hope you had a good Easter Sunday, and I do wish you a happy summer!  See you in November.    We reviewed your living well wishes and your CODE STATUS, and we will assign your DAUGHTER as your DURABLE POWER OF  for healthcare matters.  This means that if anything were to happen to you, and if you are unable to express your wishes, she will be the person whom we will contact, and she can speak on your behalf and make medical decisions for you.  Your WIFE will be the alternate and will be the immediate second person that we will contact.    Your CODE STATUS is FULL CODE, meaning that we will do everything possible to save your life and to preserve it.  Afterwards, when you are stable, we will discuss with you and your family how you are doing and what your options are.    At the very least, we will try our best to maintain your quality of life and keep you comfortable.  Thank you for discussing this with me.            0  Return in 6 months.  40 minutes please.  Repeat FASTING laboratory examination, then see me for Medicare Wellness evaluation.  Reassess hemodynamics, cardiovascular risk, preventive strategies, mood, energy, function, cognition.  Coordinate with pulmonary medicine, offer physical therapy once more.  Watch out for debility, caregiver stress.  Prepare for winter.            0

## 2025-05-23 ENCOUNTER — PATIENT OUTREACH (OUTPATIENT)
Dept: PRIMARY CARE | Facility: CLINIC | Age: 80
End: 2025-05-23
Payer: MEDICARE

## 2025-05-28 ENCOUNTER — TELEPHONE (OUTPATIENT)
Dept: PULMONOLOGY | Facility: CLINIC | Age: 80
End: 2025-05-28
Payer: MEDICARE

## 2025-05-28 DIAGNOSIS — J41.8 MIXED SIMPLE AND MUCOPURULENT CHRONIC BRONCHITIS (MULTI): ICD-10-CM

## 2025-05-28 DIAGNOSIS — J41.8 MIXED SIMPLE AND MUCOPURULENT CHRONIC BRONCHITIS (MULTI): Primary | ICD-10-CM

## 2025-05-28 DIAGNOSIS — J96.11 CHRONIC RESPIRATORY FAILURE WITH HYPOXIA: ICD-10-CM

## 2025-05-28 RX ORDER — AMOXICILLIN AND CLAVULANATE POTASSIUM 875; 125 MG/1; MG/1
875 TABLET, FILM COATED ORAL 2 TIMES DAILY
Qty: 14 TABLET | Refills: 0 | Status: SHIPPED
Start: 2025-05-28 | End: 2025-05-28 | Stop reason: SDUPTHER

## 2025-05-28 RX ORDER — AMOXICILLIN AND CLAVULANATE POTASSIUM 875; 125 MG/1; MG/1
875 TABLET, FILM COATED ORAL 2 TIMES DAILY
Qty: 14 TABLET | Refills: 0 | Status: SHIPPED | OUTPATIENT
Start: 2025-05-28 | End: 2025-06-04

## 2025-06-12 ENCOUNTER — APPOINTMENT (OUTPATIENT)
Facility: CLINIC | Age: 80
End: 2025-06-12
Payer: MEDICARE

## 2025-06-12 ENCOUNTER — HOSPITAL ENCOUNTER (OUTPATIENT)
Dept: RESPIRATORY THERAPY | Facility: HOSPITAL | Age: 80
Discharge: HOME | End: 2025-06-12
Payer: MEDICARE

## 2025-06-12 DIAGNOSIS — J41.8 MIXED SIMPLE AND MUCOPURULENT CHRONIC BRONCHITIS (MULTI): ICD-10-CM

## 2025-06-12 LAB
MGC ASCENT PFT - FEV1 - POST: 1.54
MGC ASCENT PFT - FEV1 - POST: 1.54
MGC ASCENT PFT - FEV1 - PRE: 1.55
MGC ASCENT PFT - FEV1 - PRE: 1.55
MGC ASCENT PFT - FEV1 - PREDICTED: 2.41
MGC ASCENT PFT - FEV1 - PREDICTED: 2.41
MGC ASCENT PFT - FVC - POST: 4.14
MGC ASCENT PFT - FVC - POST: 4.14
MGC ASCENT PFT - FVC - PRE: 4.08
MGC ASCENT PFT - FVC - PRE: 4.08
MGC ASCENT PFT - FVC - PREDICTED: 3.23
MGC ASCENT PFT - FVC - PREDICTED: 3.23

## 2025-06-12 PROCEDURE — 94726 PLETHYSMOGRAPHY LUNG VOLUMES: CPT

## 2025-06-12 PROCEDURE — 94618 PULMONARY STRESS TESTING: CPT

## 2025-06-12 PROCEDURE — 2500000002 HC RX 250 W HCPCS SELF ADMINISTERED DRUGS (ALT 637 FOR MEDICARE OP, ALT 636 FOR OP/ED): Performed by: NURSE PRACTITIONER

## 2025-06-12 PROCEDURE — 94640 AIRWAY INHALATION TREATMENT: CPT

## 2025-06-12 RX ORDER — ALBUTEROL SULFATE 90 UG/1
1 INHALANT RESPIRATORY (INHALATION) ONCE
Status: COMPLETED | OUTPATIENT
Start: 2025-06-12 | End: 2025-06-12

## 2025-06-12 RX ORDER — ALBUTEROL SULFATE 0.83 MG/ML
3 SOLUTION RESPIRATORY (INHALATION) ONCE
Status: COMPLETED | OUTPATIENT
Start: 2025-06-12 | End: 2025-06-12

## 2025-06-12 RX ADMIN — ALBUTEROL SULFATE 3 ML: 2.5 SOLUTION RESPIRATORY (INHALATION) at 15:39

## 2025-06-19 ENCOUNTER — APPOINTMENT (OUTPATIENT)
Facility: CLINIC | Age: 80
End: 2025-06-19
Payer: MEDICARE

## 2025-06-19 VITALS
HEART RATE: 84 BPM | BODY MASS INDEX: 23.95 KG/M2 | DIASTOLIC BLOOD PRESSURE: 75 MMHG | TEMPERATURE: 98.2 F | OXYGEN SATURATION: 91 % | HEIGHT: 66 IN | WEIGHT: 149 LBS | SYSTOLIC BLOOD PRESSURE: 117 MMHG

## 2025-06-19 DIAGNOSIS — J96.11 CHRONIC RESPIRATORY FAILURE WITH HYPOXIA: ICD-10-CM

## 2025-06-19 DIAGNOSIS — J41.8 MIXED SIMPLE AND MUCOPURULENT CHRONIC BRONCHITIS (MULTI): Primary | ICD-10-CM

## 2025-06-19 PROCEDURE — G2211 COMPLEX E/M VISIT ADD ON: HCPCS | Performed by: INTERNAL MEDICINE

## 2025-06-19 PROCEDURE — 1036F TOBACCO NON-USER: CPT | Performed by: INTERNAL MEDICINE

## 2025-06-19 PROCEDURE — 99214 OFFICE O/P EST MOD 30 MIN: CPT | Performed by: INTERNAL MEDICINE

## 2025-06-19 PROCEDURE — 1159F MED LIST DOCD IN RCRD: CPT | Performed by: INTERNAL MEDICINE

## 2025-06-19 PROCEDURE — 1126F AMNT PAIN NOTED NONE PRSNT: CPT | Performed by: INTERNAL MEDICINE

## 2025-06-19 ASSESSMENT — PAIN SCALES - GENERAL: PAINLEVEL_OUTOF10: 0-NO PAIN

## 2025-06-19 ASSESSMENT — ENCOUNTER SYMPTOMS
LOSS OF SENSATION IN FEET: 0
OCCASIONAL FEELINGS OF UNSTEADINESS: 0
DEPRESSION: 0

## 2025-06-19 ASSESSMENT — COPD QUESTIONNAIRES
QUESTION1_COUGHFREQUENCY: 4
QUESTION7_SLEEPQUALITY: 0 - I SLEEP SOUNDLY
QUESTION6_LEAVINGHOUSE: 1
QUESTION8_ENERGYLEVEL: 2
QUESTION5_HOMEACTIVITIES: 2
QUESTION3_CHESTTIGHTNESS: 0 - MY CHEST DOES NOT FEEL TIGHT AT ALL
QUESTION4_WALKINCLINE: 3
CAT_TOTALSCORE: 15
QUESTION2_CHESTPHLEGM: 3

## 2025-06-19 NOTE — PROGRESS NOTES
[facility]  Follow Up Visit Note      HISTORY OF PRESENT ILLNESS     HISTORY OF PRESENT ILLNESS   Sindhu Salazar is a 80 y.o. male with a history of Prostate Cancer, Basal cell carcinoma of the face, and COPD, who is a former smoker (~75 pack years), who presents to a [facility] for a follow up evaluation for COPD.      Current History  Mr. Salazar presented to the office today for follow-up.  Respiratory status has been stable.  He was admitted to the hospital in March with bibasilar pneumonia.  Using Trelegy 100 mcg every day.  Denies persistent cough or purulent sputum production.  He has supplemental oxygen at home to use at night and as needed with activities.  Most recent pulmonary function test revealed mild airflow obstruction with substantial decline in diffusion capacity.    Previous notes by Ami Delacruz CNP  11/6/2024: Since last visit he reports productive cough on/off, having days with increased mucus and some days not as much.  Has been using Mucinex as needed.  He was prescribed NAC a while back that he was not interested in taking after seeing side effects.  Continues Dulera 2 puffs twice daily.  Did not start Spiriva after last visit.  Using nebulizer once daily.  Last visit he complained of feeling his symptoms of shortness of breath were worsening.  Did get new PFTs that revealed moderate obstruction and reduced DLCO with air trapping, as well as glottic closure.    6MWT he started at 95% on room air.  He desaturated after 3 minutes and 30 seconds to 86% on room air.  After 2 L of supplemental oxygen he incremented to 95% on 2 L of oxygen.  CAT 19    6/19/24: Since last visit he reports using Dulera 2 puffs twice daily.  Rarely uses Combivent.  Uses Mucinex as needed states it was helping but he is afraid to overdo it.  Uses nebulizer once a day.  States he is coughing up yellow phlegm, wheezing and having dyspnea on  exertion.  He feels his symptoms have worsened since December.  He has PFTs scheduled for 7/29/2024 he denies shortness of breath at rest, chest tightness, GERD, nighttime symptoms and ER visits for breathing issues.    12/14/23: Since last visit Mr. Salazar had a flare, needed antibiotic and prednisone.  He reports he feels much better and back to baseline.  Still having yellow to light brown mucus, occasional wheezing and having difficulty bringing up phlegm at times.  Has not been using his nebulizer.  Uses Dulera 2 puffs twice a day and rarely using Combivent.  He denies fevers, chills, chest pain and ER visits for breathing issues.  He is up-to-date on vaccines    5/19/23: Mr. Salazar was seeing pulmonology at [facility] and is switching his care over to . He is currently using Symbicort 2 puffs BID. He has 3 Dulera inhalers left at home that he is going to switch back to when finished with symbicort. He uses duo nebs twice daily and combivent when he is out, does not use often. Bronchitis 2 months ago requiring medrol dose pack and antibiotic feeling back to baseline. He reports having a cold about a week ago and is feeling much better. He coughs occasionally with yellowish mucus. Occasional wheeze. ETIENNE with stairs. Denies SOB at rest, chest pain and ER visits for breathing issues.      Previous pulmonary history: COPD.     Inhalers/nebulized medications: Dulera, combivent and duo nebs     Hospitalization History: He has not been hospitalized over the last year for breathing related problem.     Sleep history: Denies snoring, apnea, feeling tired during the day or taking naps during the day.        REVIEW OF SYSTEMS     REVIEW OF SYSTEMS  Review of Systems    Constitutional: No fever, no chills, no night sweats.    Eyes: No double vision, no floaters, no dry eyes.   ENT: See HPI.   Neck: No neck stiffness.  Cardiovascular: No sharp chest pain, no heart racing, no leg swelling.  Respiratory: as noted in HPI.    Integumentary: No rashes or sores.  Neurological: No dizziness, no headaches. Sleeping well.  Psychiatric: No mood changes.       ALLERGIES AND MEDICATIONS     ALLERGIES  Allergies   Allergen Reactions    Iodinated Contrast Media Hives    Iodine Hives    Sulfa (Sulfonamide Antibiotics) Rash       MEDICATIONS  Current Outpatient Medications   Medication Sig Dispense Refill    acetaminophen (TylenoL) 325 mg capsule Take 1 capsule (325 mg) by mouth if needed.      acetylcysteine (Mucomyst) 200 mg/mL (20 %) nebulizer solution Take 4 mL (800 mg) by nebulization 2 times a day. 240 mL 6    cholecalciferol (Vitamin D-3) 1.25 mg (50,000 units) capsule Please take 1 capsule by mouth with food once a week only.  Same time and day please each week.  Lots of fluids throughout the day.  Thank you. 13 capsule 1    magnesium oxide (Mag-Ox) 250 mg magnesium tablet Please take 1 tablet by mouth with breakfast, and again 1 tablet by mouth with supper.  Always with food please.  Thank you. 180 tablet 0    oxygen (O2) gas therapy Inhale 2.5 L/min once daily as needed (shortness of breath). 1 each     fluticasone-umeclidin-vilanter (TRELEGY-ELLIPTA) 100-62.5-25 mcg blister with device Inhale 1 puff once daily. 1 each 3    ipratropium (Atrovent) 0.02 % nebulizer solution Take 2.5 mL (0.5 mg) by nebulization 4 times a day. 300 mL 3    sertraline (Zoloft) 25 mg tablet Please take 1 tablet by mouth with SUPPER every evening.  Thank you. (Patient not taking: Reported on 6/19/2025) 90 tablet 1     No current facility-administered medications for this visit.         PAST HISTORY     PAST MEDICAL HISTORY  Prostate Cancer  Basal cell carcinoma of the face  COPD     PAST SURGICAL HISTORY  Past Surgical History:   Procedure Laterality Date    FINGER AMPUTATION      INNER EAR SURGERY      PROSTATE SURGERY         IMMUNIZATION HISTORY  Immunization History   Administered Date(s) Administered    Pfizer Gray Cap SARS-CoV-2 04/14/2022       SOCIAL  HISTORY  Tobacco Smokin -47 y/o - 3 packs - quit in  - ~75 pack years   Illicit drugs: none  Alcohol consumption:  socially  Pets: none    OCCUPATIONAL/ENVIRONMENTAL HISTORY  Occupation: Previously worked as  for [employer]. House he used to live in had asbestos .     FAMILY HISTORY    No have a family history of pulmonary disease.  No have a family history of cancer.      PHYSICAL EXAM     VITAL SIGNS:   Vitals:    25 0837   BP: 117/75   Pulse: 84   Temp: 36.8 °C (98.2 °F)   SpO2: 91%             PREVIOUS WEIGHTS: Body mass index is 24.05 kg/m².      Wt Readings from Last 3 Encounters:   25 67.6 kg (149 lb)   25 67.6 kg (149 lb)   25 68.4 kg (150 lb 12.8 oz)       Physical Exam    Constitutional: General appearance: Alert and oriented.  No acute distress. Well developed, well nourished.  Head and face: Symmetric  Pulmonary: Chest is normal. No increased work of breathing or signs of respiratory distress.  Prolonged exhalation, Clear to auscultation bilaterally - no crackles, wheezing, or rhonchi.   Cardiovascular: Heart rate and rhythm normal. Normal S1, S2   Extremities: No edema. No clubbing or cyanosis of the fingernails.    Neurologic: Moves all four extremities  Psychiatric: Intact judgement and insight.    RESULTS/DATA     Pulmonary Function Test Results                   Chest Radiograph     No results found for this or any previous visit from the past 2000 days.      Chest CT Scan     No results found for this or any previous visit from the past 365 days.      Echocardiogram     No results found for this or any previous visit from the past 365 days.       Labwork   Complete Blood Count  Lab Results   Component Value Date    WBC 4.0 2025    HGB 14.5 2025    HCT 44.1 2025    MCV 96.7 2025     2025       Peripheral Eosinophil Count/Percentage:   ABSOLUTE EOSINOPHILS (cells/uL)   Date Value   2025 80     EOSINOPHILS (%)  "  Date Value   04/09/2025 2.0       Serum Immunoglobulin E:    No results found for: \"IGE\"       ASSESSMENT/PLAN     Mr. Salazar is a 80 y.o. male, with a history of Prostate Cancer, Basal cell carcinoma of the face, and COPD, who is a former smoker (~75 pack years), who presents to a [facility] for a follow up evaluation for COPD.    6MWT 11/6/24: Patient started at 95% on room air.  He desaturated after 3 minutes and 30 seconds to 86% on room air.  After 2 L of supplemental oxygen he incremented to 95% on 2 L of oxygen.    Problem List and Orders  Problem List Items Addressed This Visit    None          Assessment and Plan / Recommendations:  Problem List Items Addressed This Visit    None       COPD (mild airflow obstruction) with severe decline in DLCO  Severe panlobular emphysema     - Continue on current inhaler regimen  - Continue Combivent inhaler or ipratropium- albuterol nebulizer every 4-6 hours as needed  - Pulmonary rehab is recommended     Chronic hypoxic respiratory failure  - Wear 2 L of oxygen to maintain an SpO2 of 90% or greater with activity and sleep    Follow up in 6 months , sooner if necessary based on CT scan results.    If you have any questions please call the office [phone]      "

## 2025-07-17 DIAGNOSIS — J41.8 MIXED SIMPLE AND MUCOPURULENT CHRONIC BRONCHITIS (MULTI): ICD-10-CM

## 2025-07-17 DIAGNOSIS — J96.11 CHRONIC RESPIRATORY FAILURE WITH HYPOXIA: ICD-10-CM

## 2025-07-17 DIAGNOSIS — J44.1 COPD EXACERBATION (MULTI): ICD-10-CM

## 2025-07-17 RX ORDER — FLUTICASONE FUROATE, UMECLIDINIUM BROMIDE AND VILANTEROL TRIFENATATE 100; 62.5; 25 UG/1; UG/1; UG/1
1 POWDER RESPIRATORY (INHALATION) DAILY
Qty: 180 EACH | Refills: 1 | Status: SHIPPED | OUTPATIENT
Start: 2025-07-17

## 2025-11-17 ENCOUNTER — APPOINTMENT (OUTPATIENT)
Dept: PRIMARY CARE | Facility: CLINIC | Age: 80
End: 2025-11-17
Payer: MEDICARE

## 2025-12-18 ENCOUNTER — APPOINTMENT (OUTPATIENT)
Facility: CLINIC | Age: 80
End: 2025-12-18
Payer: MEDICARE